# Patient Record
Sex: FEMALE | Race: BLACK OR AFRICAN AMERICAN | NOT HISPANIC OR LATINO
[De-identification: names, ages, dates, MRNs, and addresses within clinical notes are randomized per-mention and may not be internally consistent; named-entity substitution may affect disease eponyms.]

---

## 2017-12-14 PROBLEM — Z00.00 ENCOUNTER FOR PREVENTIVE HEALTH EXAMINATION: Status: ACTIVE | Noted: 2017-12-14

## 2017-12-21 ENCOUNTER — APPOINTMENT (OUTPATIENT)
Dept: ORTHOPEDIC SURGERY | Facility: CLINIC | Age: 82
End: 2017-12-21
Payer: MEDICARE

## 2017-12-21 VITALS — WEIGHT: 123 LBS | HEIGHT: 62 IN | BODY MASS INDEX: 22.63 KG/M2

## 2017-12-21 VITALS — DIASTOLIC BLOOD PRESSURE: 82 MMHG | SYSTOLIC BLOOD PRESSURE: 181 MMHG | HEART RATE: 85 BPM

## 2017-12-21 DIAGNOSIS — Z87.891 PERSONAL HISTORY OF NICOTINE DEPENDENCE: ICD-10-CM

## 2017-12-21 DIAGNOSIS — Z78.9 OTHER SPECIFIED HEALTH STATUS: ICD-10-CM

## 2017-12-21 DIAGNOSIS — Z86.79 PERSONAL HISTORY OF OTHER DISEASES OF THE CIRCULATORY SYSTEM: ICD-10-CM

## 2017-12-21 DIAGNOSIS — S52.125A NONDISPLACED FRACTURE OF HEAD OF LEFT RADIUS, INITIAL ENCOUNTER FOR CLOSED FRACTURE: ICD-10-CM

## 2017-12-21 DIAGNOSIS — Z86.39 PERSONAL HISTORY OF OTHER ENDOCRINE, NUTRITIONAL AND METABOLIC DISEASE: ICD-10-CM

## 2017-12-21 PROCEDURE — 99204 OFFICE O/P NEW MOD 45 MIN: CPT | Mod: 57

## 2017-12-21 PROCEDURE — 24650 CLTX RDL HEAD/NCK FX WO MNPJ: CPT | Mod: LT

## 2017-12-21 PROCEDURE — 73110 X-RAY EXAM OF WRIST: CPT | Mod: LT

## 2017-12-21 PROCEDURE — 73080 X-RAY EXAM OF ELBOW: CPT | Mod: LT

## 2017-12-21 RX ORDER — CHLORTHALIDONE 25 MG/1
25 TABLET ORAL
Refills: 0 | Status: ACTIVE | COMMUNITY

## 2017-12-21 RX ORDER — SIMVASTATIN 10 MG/1
10 TABLET, FILM COATED ORAL
Refills: 0 | Status: ACTIVE | COMMUNITY

## 2017-12-21 RX ORDER — ATENOLOL 50 MG/1
50 TABLET ORAL
Refills: 0 | Status: ACTIVE | COMMUNITY

## 2017-12-21 RX ORDER — POTASSIUM CHLORIDE 2 MEQ/ML
2 INJECTION, SOLUTION, CONCENTRATE INTRAVENOUS
Refills: 0 | Status: ACTIVE | COMMUNITY

## 2017-12-21 RX ORDER — ERGOCALCIFEROL 1.25 MG/1
1.25 MG CAPSULE ORAL
Refills: 0 | Status: ACTIVE | COMMUNITY

## 2017-12-22 PROBLEM — S52.125A CLOSED NONDISPLACED FRACTURE OF HEAD OF LEFT RADIUS, INITIAL ENCOUNTER: Status: ACTIVE | Noted: 2017-12-22

## 2018-01-11 ENCOUNTER — APPOINTMENT (OUTPATIENT)
Dept: ORTHOPEDIC SURGERY | Facility: CLINIC | Age: 83
End: 2018-01-11
Payer: MEDICARE

## 2018-01-11 VITALS
DIASTOLIC BLOOD PRESSURE: 83 MMHG | WEIGHT: 124 LBS | HEIGHT: 62 IN | BODY MASS INDEX: 22.82 KG/M2 | HEART RATE: 86 BPM | SYSTOLIC BLOOD PRESSURE: 183 MMHG

## 2018-01-11 PROCEDURE — 99024 POSTOP FOLLOW-UP VISIT: CPT

## 2018-01-11 PROCEDURE — 73110 X-RAY EXAM OF WRIST: CPT | Mod: LT

## 2018-01-11 PROCEDURE — 73080 X-RAY EXAM OF ELBOW: CPT | Mod: LT

## 2018-01-11 RX ORDER — POTASSIUM CHLORIDE 600 MG/1
8 TABLET, FILM COATED, EXTENDED RELEASE ORAL
Qty: 90 | Refills: 0 | Status: ACTIVE | COMMUNITY
Start: 2017-02-06

## 2018-02-12 ENCOUNTER — APPOINTMENT (OUTPATIENT)
Dept: ORTHOPEDIC SURGERY | Facility: CLINIC | Age: 83
End: 2018-02-12
Payer: MEDICARE

## 2018-02-12 VITALS — DIASTOLIC BLOOD PRESSURE: 77 MMHG | HEART RATE: 80 BPM | SYSTOLIC BLOOD PRESSURE: 180 MMHG

## 2018-02-12 DIAGNOSIS — M25.532 PAIN IN LEFT WRIST: ICD-10-CM

## 2018-02-12 DIAGNOSIS — S52.125D NONDISPLACED FRACTURE OF HEAD OF LEFT RADIUS, SUBSEQUENT ENCOUNTER FOR CLOSED FRACTURE WITH ROUTINE HEALING: ICD-10-CM

## 2018-02-12 PROCEDURE — 73110 X-RAY EXAM OF WRIST: CPT | Mod: LT

## 2018-02-12 PROCEDURE — 99024 POSTOP FOLLOW-UP VISIT: CPT

## 2019-09-17 ENCOUNTER — EMERGENCY (EMERGENCY)
Facility: HOSPITAL | Age: 84
LOS: 1 days | Discharge: HOME | End: 2019-09-17
Attending: EMERGENCY MEDICINE
Payer: COMMERCIAL

## 2019-09-17 VITALS
OXYGEN SATURATION: 95 % | DIASTOLIC BLOOD PRESSURE: 81 MMHG | TEMPERATURE: 99 F | HEART RATE: 85 BPM | RESPIRATION RATE: 16 BRPM | SYSTOLIC BLOOD PRESSURE: 170 MMHG

## 2019-09-17 VITALS
DIASTOLIC BLOOD PRESSURE: 84 MMHG | SYSTOLIC BLOOD PRESSURE: 169 MMHG | TEMPERATURE: 99 F | HEART RATE: 112 BPM | HEIGHT: 62 IN | OXYGEN SATURATION: 95 % | WEIGHT: 169.98 LBS | RESPIRATION RATE: 16 BRPM

## 2019-09-17 PROCEDURE — 73562 X-RAY EXAM OF KNEE 3: CPT | Mod: 26,LT

## 2019-09-17 PROCEDURE — 99283 EMERGENCY DEPT VISIT LOW MDM: CPT

## 2019-09-17 PROCEDURE — 73502 X-RAY EXAM HIP UNI 2-3 VIEWS: CPT | Mod: 26,LT

## 2019-09-17 PROCEDURE — 73502 X-RAY EXAM HIP UNI 2-3 VIEWS: CPT

## 2019-09-17 PROCEDURE — 99284 EMERGENCY DEPT VISIT MOD MDM: CPT | Mod: 25

## 2019-09-17 PROCEDURE — 73562 X-RAY EXAM OF KNEE 3: CPT

## 2019-09-17 RX ORDER — CYCLOBENZAPRINE HYDROCHLORIDE 10 MG/1
5 TABLET, FILM COATED ORAL ONCE
Refills: 0 | Status: COMPLETED | OUTPATIENT
Start: 2019-09-17 | End: 2019-09-17

## 2019-09-17 RX ORDER — ACETAMINOPHEN 500 MG
650 TABLET ORAL ONCE
Refills: 0 | Status: COMPLETED | OUTPATIENT
Start: 2019-09-17 | End: 2019-09-17

## 2019-09-17 RX ORDER — CYCLOBENZAPRINE HYDROCHLORIDE 10 MG/1
1 TABLET, FILM COATED ORAL
Qty: 10 | Refills: 0
Start: 2019-09-17 | End: 2019-09-26

## 2019-09-17 RX ADMIN — CYCLOBENZAPRINE HYDROCHLORIDE 5 MILLIGRAM(S): 10 TABLET, FILM COATED ORAL at 05:06

## 2019-09-17 RX ADMIN — Medication 650 MILLIGRAM(S): at 05:06

## 2019-09-17 NOTE — ED PROVIDER NOTE - CLINICAL SUMMARY MEDICAL DECISION MAKING FREE TEXT BOX
Patient with worsening right knee pain improved while in the ED. Give Flexeril, pt stable for d/c. Patient with worsening right knee pain improved while in the ED. Give Flexeril, pt stable for d/c.    Xrs show no acute fracture/dislocation. in ED patient able to ambulate though with mild limp. Son will take her home.

## 2019-09-17 NOTE — ED PROVIDER NOTE - PHYSICAL EXAMINATION
Right knee:  Tenderness to anterior left knee  Mild effusion palpated  No erythema, no warmth  Normal ROM of hip  2 plus DP/PT pulses,  Normal cap refill

## 2019-09-17 NOTE — ED PROVIDER NOTE - OBJECTIVE STATEMENT
88 y/o F patient with a significant PMHx of HLD, HTN, and no significant PSHx presents to the ED with worsening right knee pain. Patient denies trauma but states for the last x2 months, she has been having intermittent pain to the right knee. Patient says for the last couple of days, her pain has been radiating to the left hip. Patient says her pain was so severe tonight, she had to present to the ED for evaluation/ Patient denies falls and states her symptoms have significantly improved. Patient denies any other complaints. NKDA.

## 2019-09-17 NOTE — ED PROVIDER NOTE - CARE PROVIDER_API CALL
Bg Ashford)  Orthopaedic Surgery  [address] [place] [address], [address] [place] [address]  Phone: [phone]  Fax: [phone]  Follow Up Time:

## 2019-09-17 NOTE — ED PROVIDER NOTE - PATIENT PORTAL LINK FT
You can access the FollowMyHealth Patient Portal offered by [facility] by registering at the following website: [email or web address]. By joining [employer]’s FollowMyHealth portal, you will also be able to view your health information using other applications (apps) compatible with our system.

## 2019-09-19 PROBLEM — I10 ESSENTIAL (PRIMARY) HYPERTENSION: Chronic | Status: ACTIVE | Noted: 2019-09-17

## 2019-09-19 PROBLEM — E78.5 HYPERLIPIDEMIA, UNSPECIFIED: Chronic | Status: ACTIVE | Noted: 2019-09-17

## 2019-09-23 ENCOUNTER — APPOINTMENT (OUTPATIENT)
Dept: ORTHOPEDIC SURGERY | Facility: CLINIC | Age: 84
End: 2019-09-23
Payer: MEDICARE

## 2019-09-23 VITALS
HEART RATE: 80 BPM | DIASTOLIC BLOOD PRESSURE: 80 MMHG | SYSTOLIC BLOOD PRESSURE: 161 MMHG | BODY MASS INDEX: 29.44 KG/M2 | HEIGHT: 62 IN | WEIGHT: 160 LBS

## 2019-09-23 DIAGNOSIS — M17.12 UNILATERAL PRIMARY OSTEOARTHRITIS, LEFT KNEE: ICD-10-CM

## 2019-09-23 PROCEDURE — 73564 X-RAY EXAM KNEE 4 OR MORE: CPT | Mod: LT

## 2019-09-23 PROCEDURE — 99214 OFFICE O/P EST MOD 30 MIN: CPT

## 2019-09-23 RX ORDER — DICLOFENAC SODIUM 20 MG/G
2 SOLUTION TOPICAL
Qty: 1 | Refills: 0 | Status: ACTIVE | COMMUNITY
Start: 2019-09-23 | End: 1900-01-01

## 2019-09-23 NOTE — DISCUSSION/SUMMARY
[de-identified] : Knee patellofemoral chondromalacia\par \par The patient and I discussed the causes and progression of degenerative joint disease of the knee. Models, diagrams and drawings were used in the discussion. Treatment can include conservative non-operative management and surgical options. Conservative management includes weight loss, activity modification, physical therapy to improve motion and strength in the muscles around the knee and the body's core, PO and topical NSAIDs, corticosteroid and/or viscosupplementation intra-articular injections. If the patient fails to improve with non-operative management, surgical management is possible. Depending upon the patient's age, BMI, activity level, degree and location of arthrosis different surgical options are possible including arthroscopic debridement with chondroplasty, high-tibial osteotomy, unicondylar/partial arthroplasty, and total joint arthroplasty.\par \par Physical therapy was prescribed for knee ROM exercises, strengthening exercises, deep tissue massage, core strengthening, hip abductor strengthening, VMO strengthening, modalities PRN, and home exercises\par \par \par The patient was prescribed Diclofenac topical liquid/creme non-steroidal anti-inflammatory medication. 1-2 pumps twice daily and apply to area with pain. There is low systemic absorption of the medication but risks while reduced remain were discussed and include but not limited to renal damage and GI ulceration and bleeding. They were warned to stop the medication if worsening buring skin or gastric pain or dizziness or other side effects. Also to immediately stop the medication and seek appropriate medical attention if any severe stomach ache, gastritis, black/red vomit, black/red stools or any other medical concern.\par \par declined CSI\par \par The patient verifies their understanding the the visit, diagnosis and plan. They agree with the treatment plan and will contact the office with any questions or problems.\par Follow up\par PRN

## 2019-09-23 NOTE — HISTORY OF PRESENT ILLNESS
[de-identified] : CC Left knee\par \par HPI 86 yo female right HD presents with [chronic] onset of 3 weeks of activity related pain in the anterior Left knee [without injury]. The pain is [worse], and rated a 10 out of 10, described as pain, [without radiation]. [Rest] makes the pain better and [walking standing stairs] makes the pain worse. The patient reports associated symptoms of grinding. The patient - pain at night affecting sleep, and + similar pain previously.\par \par The patient has tried the following treatments:\par Activity modification	+\par Ice/Compression  	+\par Braces    		-\par Nsaids    		+\par Physical Therapy 	-\par Cortisone Injection	-\par Visco Injection		-\par Arthroscopy		-\par \par Review of Systems is positive for the above musculoskeletal symptoms and is otherwise non-contributory for general, constitutional, psychiatric, neurologic, HEENT, cardiac, respiratory, gastrointestinal, reproductive, lymphatic, and dermatologic complaints.\par \par Consult by Dr Francie Copeland\par \par

## 2019-09-23 NOTE — PHYSICAL EXAM
[de-identified] : Physical Examination\par General: well nourished, in no acute distress, alert and oriented to person, place and time\par Psychiatric: normal mood and affect, no abnormal movements or speech patterns\par Eyes: vision intact - glasses\par Throat: no thyromegaly\par Lymph: no enlarged nodes, no lymphedema in extremity\par Respiratory: no wheezing, no shortness of breath with ambulation\par Cardiac: no cardiac leg swelling, 2+ peripheral pulses\par Neurology: normal gross sensation in extremities to light touch\par Abdomen: soft, non-tender, tympanic, no masses\par \par Musculoskeletal Examination\par Ambulation	+ antalgic gait, - assistive devices\par \par Knee			Right			Left\par General\par      Swelling/Deformity	normal			normal	\par      Skin			normal			normal\par      Erythema		-			-\par      Standing Alignment	neutral			neutral\par      Effusion		none			trace\par Range of Motion\par      Hip			full painless ROM		full painless ROM\par      Knee Flexion		130			125\par      Knee Extension	0			0\par Patella\par      J Sign		-			-\par      Quad Medial/Lateral	1/1 1/1\par      Apprehension		-			-\par      Mian's		-			+\par      Grind Sign		-			+\par      Crepitus		-			+\par Palpation\par      Medial Joint Line	-			-\par      Medial Fem Condyle	-			-\par      Lateral Joint Line	-			+\par      Quad Tendon		-			-\par      Patella Tendon	-			-\par      Medial Patella		-			-\par      Lateral Patella 	-			-\par      Posterior Knee	-			-\par Ligamentous\par      Varus @ 0° / 30°	-/-			-/-\par      Valgus @ 0° / 30°	-/-			-/-\par      Lachman		-			-\par      Pivot Shift		-			-\par      Anterior Drawer	-			-\par      Posterior Drawer	-			-\par Meniscus\par      Robert		-			-\par      Flexion Pinch		-			-\par Strength Examination/Atrophy\par      Hip Flexors 		5+			5+\par      Quadriceps		5+			5+\par      Hamstring		5+			5+\par      Tibialis Anterior	5+			5+\par      Achilles/Soleus	5+			5+\par Sensation\par      Deep Peroneal	normal			normal\par      Superficial Peroneal 	normal			normal\par      Sural  		normal			normal\par      Posterior Tibial 	normal			normal\par      Saphneous 		normal			normal\par Pulses\par      DP			2+			2+\par  [de-identified] : 5 views of the affected Left knee (standing AP, flexing standing AP, 30degree flexed lateral, 0degree lateral, sunrise view)\par were ordered, obtained and evaluated by myself today and\par demonstrate:\par There is no narrowing\par trace osteophytic lipping\par trace suprapatellar effusion\par Mild osteophytes with mild patellofemoral joint space loss without evidence of tilt [or] subluxation on sunrise view\par Normal soft tissue density\par Otherwise normal osseous bone structure without fracture or dislocation

## 2021-03-14 ENCOUNTER — INPATIENT (INPATIENT)
Facility: HOSPITAL | Age: 86
LOS: 7 days | Discharge: NURSING, REHAB OR LONG-TERM CARE | DRG: 551 | End: 2021-03-22
Attending: STUDENT IN AN ORGANIZED HEALTH CARE EDUCATION/TRAINING PROGRAM | Admitting: STUDENT IN AN ORGANIZED HEALTH CARE EDUCATION/TRAINING PROGRAM
Payer: COMMERCIAL

## 2021-03-14 ENCOUNTER — TRANSCRIPTION ENCOUNTER (OUTPATIENT)
Age: 86
End: 2021-03-14

## 2021-03-14 VITALS
HEIGHT: 62 IN | TEMPERATURE: 99 F | OXYGEN SATURATION: 96 % | HEART RATE: 122 BPM | SYSTOLIC BLOOD PRESSURE: 128 MMHG | DIASTOLIC BLOOD PRESSURE: 83 MMHG | RESPIRATION RATE: 17 BRPM

## 2021-03-14 DIAGNOSIS — U07.1 COVID-19: ICD-10-CM

## 2021-03-14 DIAGNOSIS — R09.89 OTHER SPECIFIED SYMPTOMS AND SIGNS INVOLVING THE CIRCULATORY AND RESPIRATORY SYSTEMS: ICD-10-CM

## 2021-03-14 DIAGNOSIS — M17.10 UNILATERAL PRIMARY OSTEOARTHRITIS, UNSPECIFIED KNEE: ICD-10-CM

## 2021-03-14 DIAGNOSIS — Z29.9 ENCOUNTER FOR PROPHYLACTIC MEASURES, UNSPECIFIED: ICD-10-CM

## 2021-03-14 DIAGNOSIS — N18.9 CHRONIC KIDNEY DISEASE, UNSPECIFIED: ICD-10-CM

## 2021-03-14 DIAGNOSIS — E78.5 HYPERLIPIDEMIA, UNSPECIFIED: ICD-10-CM

## 2021-03-14 DIAGNOSIS — R29.898 OTHER SYMPTOMS AND SIGNS INVOLVING THE MUSCULOSKELETAL SYSTEM: ICD-10-CM

## 2021-03-14 DIAGNOSIS — I10 ESSENTIAL (PRIMARY) HYPERTENSION: ICD-10-CM

## 2021-03-14 DIAGNOSIS — D72.829 ELEVATED WHITE BLOOD CELL COUNT, UNSPECIFIED: ICD-10-CM

## 2021-03-14 DIAGNOSIS — E87.6 HYPOKALEMIA: ICD-10-CM

## 2021-03-14 DIAGNOSIS — N17.9 ACUTE KIDNEY FAILURE, UNSPECIFIED: ICD-10-CM

## 2021-03-14 LAB
ALBUMIN SERPL ELPH-MCNC: 2.8 G/DL — LOW (ref 3.5–5)
ALP SERPL-CCNC: 93 U/L — SIGNIFICANT CHANGE UP (ref 40–120)
ALT FLD-CCNC: 15 U/L DA — SIGNIFICANT CHANGE UP (ref 10–60)
ANION GAP SERPL CALC-SCNC: 14 MMOL/L — SIGNIFICANT CHANGE UP (ref 5–17)
APTT BLD: 27.8 SEC — SIGNIFICANT CHANGE UP (ref 27.5–35.5)
AST SERPL-CCNC: 11 U/L — SIGNIFICANT CHANGE UP (ref 10–40)
BASOPHILS # BLD AUTO: 0.04 K/UL — SIGNIFICANT CHANGE UP (ref 0–0.2)
BASOPHILS NFR BLD AUTO: 0.4 % — SIGNIFICANT CHANGE UP (ref 0–2)
BILIRUB SERPL-MCNC: 0.5 MG/DL — SIGNIFICANT CHANGE UP (ref 0.2–1.2)
BUN SERPL-MCNC: 25 MG/DL — HIGH (ref 7–18)
CALCIUM SERPL-MCNC: 9 MG/DL — SIGNIFICANT CHANGE UP (ref 8.4–10.5)
CHLORIDE SERPL-SCNC: 102 MMOL/L — SIGNIFICANT CHANGE UP (ref 96–108)
CO2 SERPL-SCNC: 23 MMOL/L — SIGNIFICANT CHANGE UP (ref 22–31)
CREAT SERPL-MCNC: 1.46 MG/DL — HIGH (ref 0.5–1.3)
EOSINOPHIL # BLD AUTO: 0.32 K/UL — SIGNIFICANT CHANGE UP (ref 0–0.5)
EOSINOPHIL NFR BLD AUTO: 2.8 % — SIGNIFICANT CHANGE UP (ref 0–6)
GLUCOSE SERPL-MCNC: 134 MG/DL — HIGH (ref 70–99)
HCT VFR BLD CALC: 40.9 % — SIGNIFICANT CHANGE UP (ref 34.5–45)
HGB BLD-MCNC: 13.8 G/DL — SIGNIFICANT CHANGE UP (ref 11.5–15.5)
IMM GRANULOCYTES NFR BLD AUTO: 0.8 % — SIGNIFICANT CHANGE UP (ref 0–1.5)
INR BLD: 1.25 RATIO — HIGH (ref 0.88–1.16)
LYMPHOCYTES # BLD AUTO: 1.63 K/UL — SIGNIFICANT CHANGE UP (ref 1–3.3)
LYMPHOCYTES # BLD AUTO: 14.3 % — SIGNIFICANT CHANGE UP (ref 13–44)
MCHC RBC-ENTMCNC: 29.8 PG — SIGNIFICANT CHANGE UP (ref 27–34)
MCHC RBC-ENTMCNC: 33.7 GM/DL — SIGNIFICANT CHANGE UP (ref 32–36)
MCV RBC AUTO: 88.3 FL — SIGNIFICANT CHANGE UP (ref 80–100)
MONOCYTES # BLD AUTO: 0.81 K/UL — SIGNIFICANT CHANGE UP (ref 0–0.9)
MONOCYTES NFR BLD AUTO: 7.1 % — SIGNIFICANT CHANGE UP (ref 2–14)
NEUTROPHILS # BLD AUTO: 8.49 K/UL — HIGH (ref 1.8–7.4)
NEUTROPHILS NFR BLD AUTO: 74.6 % — SIGNIFICANT CHANGE UP (ref 43–77)
NRBC # BLD: 0 /100 WBCS — SIGNIFICANT CHANGE UP (ref 0–0)
PLATELET # BLD AUTO: 265 K/UL — SIGNIFICANT CHANGE UP (ref 150–400)
POTASSIUM SERPL-MCNC: 3.1 MMOL/L — LOW (ref 3.5–5.3)
POTASSIUM SERPL-SCNC: 3.1 MMOL/L — LOW (ref 3.5–5.3)
PROT SERPL-MCNC: 7.3 G/DL — SIGNIFICANT CHANGE UP (ref 6–8.3)
PROTHROM AB SERPL-ACNC: 14.7 SEC — HIGH (ref 10.6–13.6)
RBC # BLD: 4.63 M/UL — SIGNIFICANT CHANGE UP (ref 3.8–5.2)
RBC # FLD: 13.2 % — SIGNIFICANT CHANGE UP (ref 10.3–14.5)
SARS-COV-2 RNA SPEC QL NAA+PROBE: DETECTED
SODIUM SERPL-SCNC: 139 MMOL/L — SIGNIFICANT CHANGE UP (ref 135–145)
WBC # BLD: 11.38 K/UL — HIGH (ref 3.8–10.5)
WBC # FLD AUTO: 11.38 K/UL — HIGH (ref 3.8–10.5)

## 2021-03-14 PROCEDURE — 71045 X-RAY EXAM CHEST 1 VIEW: CPT | Mod: 26

## 2021-03-14 PROCEDURE — 93971 EXTREMITY STUDY: CPT | Mod: 26,RT

## 2021-03-14 PROCEDURE — 99223 1ST HOSP IP/OBS HIGH 75: CPT | Mod: AI,GC

## 2021-03-14 PROCEDURE — 99285 EMERGENCY DEPT VISIT HI MDM: CPT

## 2021-03-14 RX ORDER — SIMVASTATIN 20 MG/1
1 TABLET, FILM COATED ORAL
Qty: 0 | Refills: 0 | DISCHARGE

## 2021-03-14 RX ORDER — DEXTROSE MONOHYDRATE, SODIUM CHLORIDE, AND POTASSIUM CHLORIDE 50; .745; 4.5 G/1000ML; G/1000ML; G/1000ML
1000 INJECTION, SOLUTION INTRAVENOUS
Refills: 0 | Status: DISCONTINUED | OUTPATIENT
Start: 2021-03-14 | End: 2021-03-15

## 2021-03-14 RX ORDER — POTASSIUM CHLORIDE 20 MEQ
40 PACKET (EA) ORAL ONCE
Refills: 0 | Status: COMPLETED | OUTPATIENT
Start: 2021-03-14 | End: 2021-03-14

## 2021-03-14 RX ORDER — LIDOCAINE 4 G/100G
1 CREAM TOPICAL DAILY
Refills: 0 | Status: DISCONTINUED | OUTPATIENT
Start: 2021-03-14 | End: 2021-03-22

## 2021-03-14 RX ORDER — SIMVASTATIN 20 MG/1
10 TABLET, FILM COATED ORAL AT BEDTIME
Refills: 0 | Status: DISCONTINUED | OUTPATIENT
Start: 2021-03-14 | End: 2021-03-22

## 2021-03-14 RX ORDER — INFLUENZA VIRUS VACCINE 15; 15; 15; 15 UG/.5ML; UG/.5ML; UG/.5ML; UG/.5ML
0.5 SUSPENSION INTRAMUSCULAR ONCE
Refills: 0 | Status: COMPLETED | OUTPATIENT
Start: 2021-03-14 | End: 2021-03-22

## 2021-03-14 RX ORDER — POTASSIUM CHLORIDE 20 MEQ
10 PACKET (EA) ORAL
Refills: 0 | Status: COMPLETED | OUTPATIENT
Start: 2021-03-14 | End: 2021-03-14

## 2021-03-14 RX ORDER — SODIUM CHLORIDE 9 MG/ML
1000 INJECTION INTRAMUSCULAR; INTRAVENOUS; SUBCUTANEOUS ONCE
Refills: 0 | Status: COMPLETED | OUTPATIENT
Start: 2021-03-14 | End: 2021-03-14

## 2021-03-14 RX ORDER — ATENOLOL 25 MG/1
50 TABLET ORAL DAILY
Refills: 0 | Status: DISCONTINUED | OUTPATIENT
Start: 2021-03-14 | End: 2021-03-22

## 2021-03-14 RX ORDER — ATENOLOL 25 MG/1
1 TABLET ORAL
Qty: 0 | Refills: 0 | DISCHARGE

## 2021-03-14 RX ORDER — HEPARIN SODIUM 5000 [USP'U]/ML
5000 INJECTION INTRAVENOUS; SUBCUTANEOUS EVERY 8 HOURS
Refills: 0 | Status: DISCONTINUED | OUTPATIENT
Start: 2021-03-14 | End: 2021-03-15

## 2021-03-14 RX ADMIN — SODIUM CHLORIDE 1000 MILLILITER(S): 9 INJECTION INTRAMUSCULAR; INTRAVENOUS; SUBCUTANEOUS at 18:00

## 2021-03-14 RX ADMIN — Medication 100 MILLIEQUIVALENT(S): at 17:24

## 2021-03-14 RX ADMIN — HEPARIN SODIUM 5000 UNIT(S): 5000 INJECTION INTRAVENOUS; SUBCUTANEOUS at 22:08

## 2021-03-14 RX ADMIN — SIMVASTATIN 10 MILLIGRAM(S): 20 TABLET, FILM COATED ORAL at 22:08

## 2021-03-14 RX ADMIN — Medication 100 MILLIEQUIVALENT(S): at 16:15

## 2021-03-14 RX ADMIN — Medication 40 MILLIEQUIVALENT(S): at 15:32

## 2021-03-14 RX ADMIN — Medication 100 MILLIEQUIVALENT(S): at 18:41

## 2021-03-14 RX ADMIN — DEXTROSE MONOHYDRATE, SODIUM CHLORIDE, AND POTASSIUM CHLORIDE 75 MILLILITER(S): 50; .745; 4.5 INJECTION, SOLUTION INTRAVENOUS at 22:08

## 2021-03-14 NOTE — ED PROVIDER NOTE - OBJECTIVE STATEMENT
Patient c/o numbness and weakness to b/l LE since 3/10, right worse than left. Right leg also swollen. Has been unable to get out bed for 2 days. granddaughter has been caring for her. Unable to bear weight due to leg weakness. No fever, cough, cp, sob, ap, n/v/d.

## 2021-03-14 NOTE — H&P ADULT - ASSESSMENT
Patient is a 89 year old, AAO x3 and ambulating at baseline, w/ PMH of osteoarthritis of left knee, HTN and HLD, presents with left lower leg weakness for the past 5 days. Admitted to r/o stroke

## 2021-03-14 NOTE — ED PROVIDER NOTE - CARE PLAN
Principal Discharge DX:	COVID-19  Secondary Diagnosis:	Hypokalemia  Secondary Diagnosis:	Dehydration

## 2021-03-14 NOTE — ED PROVIDER NOTE - CLINICAL SUMMARY MEDICAL DECISION MAKING FREE TEXT BOX
Patient with b/l leg weakness and right leg swelling. Will check labs, right leg doppler. Will need admission due to inability to ambulate.

## 2021-03-14 NOTE — H&P ADULT - ATTENDING COMMENTS
Patient seen and examined; Agree with PGY2 MAR  A/P above with editing as needed. My independent assessment, findings on exam, diagnosis and plan of care as listed below. Discussed with Dr. Domínguez and Supervising PGY3 Dr. Holden.     89 year old, AAO x3 and ambulating at baseline, w/ PMH of osteoarthritis of left knee, HTN and HLD, presents with left lower leg weakness for the past 5 days. Admitted for worsening weakness over few weeks    ROS/SH: As above; Lives with Grand daughter and Great granddaughter  FH: not contributory to current presentation  denies fever, chills, SOB, palpitations, chest pain, nausea, vomiting, diarrhea, constipation, dizziness      Vital Signs Last 24 Hrs  T(C): 37.2 (14 Mar 2021 16:02), Max: 37.4 (14 Mar 2021 12:02)  T(F): 98.9 (14 Mar 2021 16:02), Max: 99.3 (14 Mar 2021 12:02)  HR: 93 (14 Mar 2021 16:02) (93 - 122)  BP: 119/66 (14 Mar 2021 16:02) (119/66 - 128/83)  RR: 20 (14 Mar 2021 16:02) (17 - 20)  SpO2: 94% (14 Mar 2021 16:02) (94% - 96%)    P/E;  Neuro: AAO x3; Power 2/5 RLE, 3/5LLE; Sensation intact  CVS: S1S2 present, regular  Resp: BLAE+, No wheeze or Rhonchi  GI: Soft, BS+, NT  Extr: No edema or calf tenderness; Knee tenderness B/L Rt >> Lt    Labs:                        13.8   11.38 )-----------( 265      ( 14 Mar 2021 13:23 )             40.9   03-14    139  |  102  |  25<H>  ----------------------------<  134<H>  3.1<L>   |  23  |  1.46<H>    Ca    9.0      14 Mar 2021 13:23    TPro  7.3  /  Alb  2.8<L>  /  TBili  0.5  /  DBili  x   /  AST  11  /  ALT  15  /  AlkPhos  93  03-14    PT/INR - ( 14 Mar 2021 13:23 )   PT: 14.7 sec;   INR: 1.25 ratio    PTT - ( 14 Mar 2021 13:23 )  PTT:27.8 sec     US Duplex Venous Lower Ext Ltd, Right (03.14.21 @ 15:17)    IMPRESSION: No evidence of right lower extremity deep venous thrombosis.    Please note, there is limited visualization of the calf veins.  Therefore, if  clinical concern persists, reevaluation can be performed after 5 to 7 days to evaluate for propagation of clot.    D/D:  Progressive Lower extremity weakness concerning for Lumbosacral radiculopathy  Weakness also due to dehydration with  Acute Kidney injury due to poor oral intake and Diuretic use  Asymptomatic COVID infection/ exposure  Leucocytosis likely reactive    Plan:   Medicine  Airborne and Contact precautions  IV Fluids maintenance 75cc (with 20 meq KCL) x 12 hrs only  Replaced Potassium orally and riders  Hold Chlorthalidone; At this age may not be a candidate to be on diuretic; alternate agent if BP high   Continue Atenolol with parameters  Will get CT LS spine as well as a CT head to rule out any stroke which is less likely   DVT ppx  Patient has no symptoms related to COVID at this time; No Hypoxia, No cough or SOB, No diarrhea etc    Discussed with Housetaff plan of care  PGY1 will reach out to family and update

## 2021-03-14 NOTE — H&P ADULT - PROBLEM SELECTOR PLAN 3
- note to have Cr 1.46; unknown baseline  - eGFR 37  - likely 2/2 intravascular depletion  - c/w IVF   - monitor renal fx - noted to be pcr pos  - asxs  - monitor O2 sat  - f/u covid ab

## 2021-03-14 NOTE — H&P ADULT - PROBLEM SELECTOR PLAN 6
- h/o HLD, on statin  - c/w statin  - f/u lipid panel - noted to have WBC 11.38  - likely 2/2 COVID  - SIRS 2/4 (episode of tachycardia & WBC)  - qSOFA 0  - monitor WBC - note to have Cr 1.46, likely at baseline  - eGFR 37  - monitor renal fx

## 2021-03-14 NOTE — H&P ADULT - HISTORY OF PRESENT ILLNESS
Patient is a 89 year old, AAO x3 and ambulating at baseline, w/ PMH of osteoarthritis of left knee, HTN and HLD, presents with left lower leg weakness for the past 5 days. She states no significant inciting factors, except laying on right-side. She denies any associated pain, but states she is unable to ambulate due to weakness. She endorses nausea, poor oral intake and episode of diarrhea (3x/d) for a week. She denies fever, chills, vomiting, abdominal pain, hematochezia, and melena.

## 2021-03-14 NOTE — H&P ADULT - RS GEN PE MLT RESP DETAILS PC
normal/airway patent/breath sounds equal/good air movement/clear to auscultation bilaterally/no chest wall tenderness/no rales/no rhonchi/no wheezes

## 2021-03-14 NOTE — DISCHARGE NOTE PROVIDER - HOSPITAL COURSE
Patient is a 89 year old, AAO x3 and ambulating at baseline, w/ PMH of osteoarthritis of left knee, HTN and HLD, presents with left lower leg weakness for the past 5 days. Admitted  for worsening weakness over few weeks.      >>>>>>>>>>>>>>>>>>>>>>>>>>>>>>>>>>>>>>>>>I>>>>>>>NCOMPLETE>>>>>>>>>>>>>>>>>>>>>>>>>>>>>>>>>>>>>>>>>>>>>>>>>   Patient is a 89 year old, AAO x3 and ambulating at baseline, w/ PMH of osteoarthritis of left knee, HTN and HLD, presents with left lower leg weakness for the past 5 days. Admitted  for worsening weakness over few weeks.  Patient had elevated D-dimer. CT angio chest showed BL segmental PE. TTE did not show any right heart strain. patient was started on full dose Lovenox later changed to Eliquis.   Patient's Hb dropped initially. GI was consulted. Given her recent PE and COVD status, recommended to monitor. Hb was stable. PPI started empirically.   CT Lumbar Spine shows: L5-S1 suspected impingement of both exiting L5 nerve roots. L4-5 moderate bilateral neural foraminal stenosis. Orthopedic was consulted and recommended conservative management.   Neurology was on board for BL LE weakness. 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 Patient was monitored closely. Muscle strength in LE improved during hospital stay without any intervention. Peripheral neuropathy work up was sent. Patient will follow up as outpatient for further work up.  PT evaluated and deemed patient to appropriate for JOSE.        Patient is a 89 year old, AAO x3 and ambulating at baseline, w/ PMH of osteoarthritis of left knee, HTN and HLD, presents with left lower leg weakness for the past 5 days. Admitted  for worsening weakness over few weeks.  Patient had elevated D-dimer. CT angio chest showed BL segmental PE. TTE did not show any right heart strain. patient was started on full dose Lovenox and transition to Eliquis.   Patient's Hb dropped initially. GI was consulted. Given her recent PE and COVD status, recommended to monitor. Hb was stable. PPI started empirically.   CT Lumbar Spine shows: L5-S1 suspected impingement of both exiting L5 nerve roots. L4-5 moderate bilateral neural foraminal stenosis. Orthopedic was consulted and recommended conservative management.   Neurology was on board for BL LE weakness. 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 Patient was monitored closely. Muscle strength in LE improved during hospital stay without any intervention. Peripheral neuropathy work up was sent. Patient will follow up as outpatient for further work up.  PT evaluated and deemed patient to appropriate for JOSE.     Take Eliquis 10mg PO q12h till 03/27/2021 and than  Take Eliquis 5mg PO q12h.    Take cyanocobalamin 1000mcg IM weekly x 3, folic acid 2mg PO daily, B complex daily.  Follow up with your primary doctor.    Given patient's improved clinical status and current hemodynamic stability, decision was made to discharge.  Please refer to patient's complete medical chart with documents for a full hospital course, for this is only a brief summary.

## 2021-03-14 NOTE — ED PROVIDER NOTE - PROGRESS NOTE DETAILS
Patient is resting comfortably, NAD. I spoke to patient's son Charles and updated him on patient's condition. Patient endorsed to Dr. Burnette.

## 2021-03-14 NOTE — DISCHARGE NOTE PROVIDER - CARE PROVIDER_API CALL
Francie Copeland [facility]  Internal Medicine  [address] [place] [address]  Phone: [phone]  Fax: [phone]  Follow Up Time: 2 weeks   100

## 2021-03-14 NOTE — H&P ADULT - PROBLEM SELECTOR PLAN 5
- h/o hypokalemia, on potassium chloride 8mEq at home  - c/w potassium repletion - noted to have WBC 11.38  - likely 2/2 COVID  - SIRS 2/4 (episode of tachycardia & WBC)  - qSOFA 0  - monitor WBC

## 2021-03-14 NOTE — H&P ADULT - PROBLEM SELECTOR PLAN 1
- p/w RLE weakness x5d  - ddx: neuropathy vs stroke vs e-lyte inbalance  - K+ 3.2  - LE doppler unremarkable  - c/w IVF, e-lyte replacement  - f/u x-ray knee & pelvis, CTH - p/w RLE weakness x5d  - ddx: neuropathy vs stroke vs e-lyte inbalance  - K+ 3.2  - LE doppler unremarkable  - c/w IVF, e-lyte replacement  - f/u x-ray knee & pelvis, CTH, CT lumbar spine

## 2021-03-14 NOTE — H&P ADULT - MOTOR
UE strength 5/5 b/l  hip flexion and extension 3/5 on left & 2/5 on right  knee flexion and extension 4/5 on left & 1/5 of right  foot dorsiflexion and plantarflex 5/5 b/w

## 2021-03-14 NOTE — DISCHARGE NOTE PROVIDER - EXTENDED VTE OTHER REASON FREE TEXT
Patient d/c to rehab, on Eliquis, no need to send prescription to pharmacy pt will get meds at rehab.

## 2021-03-14 NOTE — H&P ADULT - RESPIRATORY AND THORAX
# Fall/LOC  seizure vs cardiac vs hypotension/orthostasis; hx does not sound mechanical  -r/o sz as above  -tropes wnl  -repeat ECG (ecg in ED of poor quality). details…

## 2021-03-14 NOTE — DISCHARGE NOTE PROVIDER - NSDCCPCAREPLAN_GEN_ALL_CORE_FT
PRINCIPAL DISCHARGE DIAGNOSIS  Diagnosis: Acute pulmonary embolism  Assessment and Plan of Treatment: You were diagnosed with pulmonary embolism. Please continue to take Eliquis as instructed. Please return to ER if you notice any bleeding.      SECONDARY DISCHARGE DIAGNOSES  Diagnosis: Peripheral neuropathy  Assessment and Plan of Treatment: You presented with BL LE weakness. We investigated the reason for weakness, but was inconclusive. You need follow up with Neurology as outpatient. You will need physical therapy.    Diagnosis: Lumbar nerve root impingement  Assessment and Plan of Treatment: You have nerve root impingement. Continue current pain management. Please avoid NSAIDs as they can damage your kidney.    Diagnosis: Anemia  Assessment and Plan of Treatment: Your hemoglobin has to be monitored. You are at high risk of bleeding as you are taking blood thinner for embolism. Please return to emergency if you notice any bleeding.    Diagnosis: HTN (hypertension)  Assessment and Plan of Treatment: HTN (hypertension)    Diagnosis: COVID-19  Assessment and Plan of Treatment: You were covid positive, which might have contributed to clots in your lungs. Please continue to take Eliquis as directed. You did not need any oxygen while in hospital.    Diagnosis: CKD (chronic kidney disease)  Assessment and Plan of Treatment: Your renal function was stable while in hospital. Please follow up with nephrology as outpatient.     PRINCIPAL DISCHARGE DIAGNOSIS  Diagnosis: Acute pulmonary embolism  Assessment and Plan of Treatment: You were diagnosed with pulmonary embolism. Please continue to take Eliquis as instructed. Please return to ER if you notice any bleeding.      SECONDARY DISCHARGE DIAGNOSES  Diagnosis: Peripheral neuropathy  Assessment and Plan of Treatment: You presented with BL LE weakness. We investigated the reason for weakness, but was inconclusive. You need follow up with Neurology as outpatient. You will need physical therapy. You are advised to take  cyanocobalamin 1000mcg IM weekly x 3, folic acid 2mg PO daily, B complex daily    Diagnosis: Lumbar nerve root impingement  Assessment and Plan of Treatment: You have nerve root impingement. Continue current pain management. Please avoid NSAIDs as they can damage your kidney.    Diagnosis: Anemia  Assessment and Plan of Treatment: Your hemoglobin has to be monitored. You are at high risk of bleeding as you are taking blood thinner for embolism. Please return to emergency if you notice any bleeding.    Diagnosis: HTN (hypertension)  Assessment and Plan of Treatment: HTN (hypertension)    Diagnosis: COVID-19  Assessment and Plan of Treatment: You were covid positive, which might have contributed to clots in your lungs. Please continue to take Eliquis as directed. You did not need any oxygen while in hospital.    Diagnosis: CKD (chronic kidney disease)  Assessment and Plan of Treatment: Your renal function was stable while in hospital. Please follow up with nephrology as outpatient.     PRINCIPAL DISCHARGE DIAGNOSIS  Diagnosis: Acute pulmonary embolism  Assessment and Plan of Treatment: You were diagnosed with pulmonary embolism. Please continue to take Eliquis as instructed. Please return to ER if you notice any bleeding.  Take Eliquis 10mg PO q12h till 03/27/2021 and than  Take Eliquis 5mg PO q12h.        SECONDARY DISCHARGE DIAGNOSES  Diagnosis: COVID-19  Assessment and Plan of Treatment: You were covid positive, which might have contributed to clots in your lungs. Please continue to take Eliquis as directed. You did not need any oxygen while in hospital.    Diagnosis: Lumbar nerve root impingement  Assessment and Plan of Treatment: You have nerve root impingement. Continue current pain management. Please avoid NSAIDs as they can damage your kidney.    Diagnosis: Peripheral neuropathy  Assessment and Plan of Treatment: You presented with BL LE weakness. We investigated the reason for weakness, but was inconclusive. You need follow up with Neurology as outpatient. You will need physical therapy. You are advised to take  cyanocobalamin 1000mcg IM weekly x 3, folic acid 2mg PO daily, B complex daily    Diagnosis: Anemia  Assessment and Plan of Treatment: Your hemoglobin has to be monitored. You are at high risk of bleeding as you are taking blood thinner for embolism. Please return to emergency if you notice any bleeding.    Diagnosis: HTN (hypertension)  Assessment and Plan of Treatment: HTN (hypertension)    Diagnosis: CKD (chronic kidney disease)  Assessment and Plan of Treatment: Your renal function was stable while in hospital. Please follow up with nephrology as outpatient.

## 2021-03-14 NOTE — H&P ADULT - PROBLEM SELECTOR PLAN 9
IMPROVE VTE Individual Risk Assessment  RISK                                                                Points  [ ] Previous VTE                                                  3  [ ] Thrombophilia                                               2  [x] Lower limb paralysis                                      2        (unable to hold up >15 seconds)    [ ] Current Cancer                                              2         (within 6 months)  [x] Immobilization > 24 hrs                                1  [ ] ICU/CCU stay > 24 hours                              1  [x] Age > 60                                                      1  IMPROVE VTE Score ___4_____    heparin for dvt ppx

## 2021-03-14 NOTE — H&P ADULT - PROBLEM SELECTOR PLAN 8
IMPROVE VTE Individual Risk Assessment  RISK                                                                Points  [ ] Previous VTE                                                  3  [ ] Thrombophilia                                               2  [x] Lower limb paralysis                                      2        (unable to hold up >15 seconds)    [ ] Current Cancer                                              2         (within 6 months)  [x] Immobilization > 24 hrs                                1  [ ] ICU/CCU stay > 24 hours                              1  [x] Age > 60                                                      1  IMPROVE VTE Score ___4_____    heparin for dvt ppx - h/o HTN, on atenolol and chlorthalidone 25mg at home  - c/w atenolol  - hold chlorthalidone given JORDI  - monitor BP

## 2021-03-14 NOTE — DISCHARGE NOTE PROVIDER - NSDCMRMEDTOKEN_GEN_ALL_CORE_FT
atenolol 50 mg oral tablet: 1 tab(s) orally once a day  chlorthalidone 25 mg oral tablet: 1 tab(s) orally once a day  potassium chloride 8 mEq (600 mg) oral tablet, extended release: 1 tab(s) orally 3 times a day  simvastatin 10 mg oral tablet: 1 tab(s) orally once a day (at bedtime)   apixaban 5 mg oral tablet: 2 tab(s) orally every 12 hours until 3/27/2021  then 5mg every 12 hours  atenolol 50 mg oral tablet: 1 tab(s) orally once a day  collagenase 250 units/g topical ointment: 1 application topically once a day, As needed, Decubitus ulcer  cyanocobalamin 1000 mcg oral tablet: 1 tab(s) orally once a day  lidocaine 5% topical film:  topically   melatonin 5 mg oral tablet: 1 tab(s) orally once a day (at bedtime)  simvastatin 10 mg oral tablet: 1 tab(s) orally once a day (at bedtime)  zinc oxide 20% topical ointment: 1 application topically once a day   apixaban 5 mg oral tablet: 2 tab(s) orally every 12 hours until 3/27/2021  then 5mg every 12 hours  atenolol 50 mg oral tablet: 1 tab(s) orally once a day  collagenase 250 units/g topical ointment: 1 application topically once a day, As needed, Decubitus ulcer  cyanocobalamin 1000 mcg oral tablet: 1 tab(s) orally once a day  folic acid 1 mg oral tablet: 1 tab(s) orally once a day  lidocaine 5% topical film:  topically   melatonin 5 mg oral tablet: 1 tab(s) orally once a day (at bedtime)  simvastatin 10 mg oral tablet: 1 tab(s) orally once a day (at bedtime)  zinc oxide 20% topical ointment: 1 application topically once a day

## 2021-03-14 NOTE — H&P ADULT - PROBLEM SELECTOR PLAN 7
- h/o HTN, on atenolol and chlorthalidone 25mg at home  - c/w atenolol  - hold chlorthalidone given JORDI  - monitor BP - h/o HLD, on statin  - c/w statin  - f/u lipid panel

## 2021-03-14 NOTE — ED ADULT NURSE NOTE - OBJECTIVE STATEMENT
states left leg has been stiff since last Wednesday and haven't been able to walk. Denies any fall or injury.

## 2021-03-14 NOTE — H&P ADULT - PROBLEM SELECTOR PLAN 4
- noted to be pcr pos  - asxs  - monitor O2 sat  - f/u covid ab - h/o hypokalemia, on potassium chloride 8mEq at home  - c/w potassium repletion

## 2021-03-15 DIAGNOSIS — R79.89 OTHER SPECIFIED ABNORMAL FINDINGS OF BLOOD CHEMISTRY: ICD-10-CM

## 2021-03-15 LAB
ALBUMIN SERPL ELPH-MCNC: 2.2 G/DL — LOW (ref 3.5–5)
ALP SERPL-CCNC: 78 U/L — SIGNIFICANT CHANGE UP (ref 40–120)
ALT FLD-CCNC: 14 U/L DA — SIGNIFICANT CHANGE UP (ref 10–60)
ANION GAP SERPL CALC-SCNC: 6 MMOL/L — SIGNIFICANT CHANGE UP (ref 5–17)
ANION GAP SERPL CALC-SCNC: 8 MMOL/L — SIGNIFICANT CHANGE UP (ref 5–17)
APTT BLD: 31.4 SEC — SIGNIFICANT CHANGE UP (ref 27.5–35.5)
AST SERPL-CCNC: 34 U/L — SIGNIFICANT CHANGE UP (ref 10–40)
BILIRUB SERPL-MCNC: 0.5 MG/DL — SIGNIFICANT CHANGE UP (ref 0.2–1.2)
BUN SERPL-MCNC: 26 MG/DL — HIGH (ref 7–18)
BUN SERPL-MCNC: 27 MG/DL — HIGH (ref 7–18)
CALCIUM SERPL-MCNC: 8.6 MG/DL — SIGNIFICANT CHANGE UP (ref 8.4–10.5)
CALCIUM SERPL-MCNC: 8.7 MG/DL — SIGNIFICANT CHANGE UP (ref 8.4–10.5)
CHLORIDE SERPL-SCNC: 109 MMOL/L — HIGH (ref 96–108)
CHLORIDE SERPL-SCNC: 112 MMOL/L — HIGH (ref 96–108)
CHOLEST SERPL-MCNC: 79 MG/DL — SIGNIFICANT CHANGE UP
CO2 SERPL-SCNC: 23 MMOL/L — SIGNIFICANT CHANGE UP (ref 22–31)
CO2 SERPL-SCNC: 24 MMOL/L — SIGNIFICANT CHANGE UP (ref 22–31)
CREAT SERPL-MCNC: 1.22 MG/DL — SIGNIFICANT CHANGE UP (ref 0.5–1.3)
CREAT SERPL-MCNC: 1.24 MG/DL — SIGNIFICANT CHANGE UP (ref 0.5–1.3)
D DIMER BLD IA.RAPID-MCNC: HIGH NG/ML DDU
FERRITIN SERPL-MCNC: 902 NG/ML — HIGH (ref 15–150)
FOLATE SERPL-MCNC: 11.6 NG/ML — SIGNIFICANT CHANGE UP
GLUCOSE SERPL-MCNC: 91 MG/DL — SIGNIFICANT CHANGE UP (ref 70–99)
GLUCOSE SERPL-MCNC: 98 MG/DL — SIGNIFICANT CHANGE UP (ref 70–99)
HDLC SERPL-MCNC: 34 MG/DL — LOW
INR BLD: 1.26 RATIO — HIGH (ref 0.88–1.16)
LIPID PNL WITH DIRECT LDL SERPL: 19 MG/DL — SIGNIFICANT CHANGE UP
MAGNESIUM SERPL-MCNC: 1.9 MG/DL — SIGNIFICANT CHANGE UP (ref 1.6–2.6)
NON HDL CHOLESTEROL: 45 MG/DL — SIGNIFICANT CHANGE UP
PHOSPHATE SERPL-MCNC: 1.8 MG/DL — LOW (ref 2.5–4.5)
POTASSIUM SERPL-MCNC: 4.9 MMOL/L — SIGNIFICANT CHANGE UP (ref 3.5–5.3)
POTASSIUM SERPL-MCNC: 5.5 MMOL/L — HIGH (ref 3.5–5.3)
POTASSIUM SERPL-SCNC: 4.9 MMOL/L — SIGNIFICANT CHANGE UP (ref 3.5–5.3)
POTASSIUM SERPL-SCNC: 5.5 MMOL/L — HIGH (ref 3.5–5.3)
PROT SERPL-MCNC: 6.2 G/DL — SIGNIFICANT CHANGE UP (ref 6–8.3)
PROTHROM AB SERPL-ACNC: 14.8 SEC — HIGH (ref 10.6–13.6)
SODIUM SERPL-SCNC: 139 MMOL/L — SIGNIFICANT CHANGE UP (ref 135–145)
SODIUM SERPL-SCNC: 143 MMOL/L — SIGNIFICANT CHANGE UP (ref 135–145)
TRIGL SERPL-MCNC: 128 MG/DL — SIGNIFICANT CHANGE UP
TSH SERPL-MCNC: 4.05 UU/ML — SIGNIFICANT CHANGE UP (ref 0.34–4.82)
VIT B12 SERPL-MCNC: 583 PG/ML — SIGNIFICANT CHANGE UP (ref 232–1245)

## 2021-03-15 PROCEDURE — 93971 EXTREMITY STUDY: CPT | Mod: 26,LT

## 2021-03-15 PROCEDURE — 99233 SBSQ HOSP IP/OBS HIGH 50: CPT | Mod: GC

## 2021-03-15 PROCEDURE — 71045 X-RAY EXAM CHEST 1 VIEW: CPT | Mod: 26

## 2021-03-15 RX ORDER — ENOXAPARIN SODIUM 100 MG/ML
70 INJECTION SUBCUTANEOUS
Refills: 0 | Status: DISCONTINUED | OUTPATIENT
Start: 2021-03-15 | End: 2021-03-18

## 2021-03-15 RX ORDER — COLLAGENASE CLOSTRIDIUM HIST. 250 UNIT/G
1 OINTMENT (GRAM) TOPICAL DAILY
Refills: 0 | Status: DISCONTINUED | OUTPATIENT
Start: 2021-03-15 | End: 2021-03-22

## 2021-03-15 RX ORDER — ZINC OXIDE 200 MG/G
1 OINTMENT TOPICAL DAILY
Refills: 0 | Status: DISCONTINUED | OUTPATIENT
Start: 2021-03-15 | End: 2021-03-22

## 2021-03-15 RX ADMIN — LIDOCAINE 1 PATCH: 4 CREAM TOPICAL at 12:24

## 2021-03-15 RX ADMIN — LIDOCAINE 1 PATCH: 4 CREAM TOPICAL at 19:45

## 2021-03-15 RX ADMIN — ATENOLOL 50 MILLIGRAM(S): 25 TABLET ORAL at 05:03

## 2021-03-15 RX ADMIN — ENOXAPARIN SODIUM 70 MILLIGRAM(S): 100 INJECTION SUBCUTANEOUS at 18:13

## 2021-03-15 RX ADMIN — SIMVASTATIN 10 MILLIGRAM(S): 20 TABLET, FILM COATED ORAL at 21:07

## 2021-03-15 RX ADMIN — HEPARIN SODIUM 5000 UNIT(S): 5000 INJECTION INTRAVENOUS; SUBCUTANEOUS at 05:03

## 2021-03-15 NOTE — ADVANCED PRACTICE NURSE CONSULT - RECOMMEDATIONS
-Clean all wounds with normal saline and apply skin prep to the surrounding skin  -Apply Collagenase ointment to the eschar areas of the R. Hip wound, apply saline moistened gauze to the wound bed, and cover with a Foam dressing Daily PRN  -Apply Zinc Oxide Moisture Barrier Cream to the Gluteal Fold, Perianal, and Bilateral Gluteal areas b.i.d. PRN  -Frequent toileting  -Moisturize the patients skin b.i.d. PRN  -Elevate/float the patients heels using heel protectors and reposition the patient Q 2hrs using wedges or pillows

## 2021-03-15 NOTE — PROGRESS NOTE ADULT - PROBLEM SELECTOR PLAN 4
- noted to be pcr pos  - asxs  - monitor O2 sat  - f/u covid ab Covid Positive  currently 98% RA   C/w Isolation Precautions  - monitor O2 sat  - f/u covid ab

## 2021-03-15 NOTE — PROGRESS NOTE ADULT - PROBLEM SELECTOR PLAN 1
- p/w RLE weakness x5d  - ddx: neuropathy vs stroke vs e-lyte inbalance  - K+ 3.2  - LE doppler unremarkable  - c/w IVF, e-lyte replacement  - f/u x-ray knee & pelvis, CTH, CT lumbar spine p/w RLE weakness x5d 2/2 lumbar Radiculopathy  - RLE doppler negative  - LLE doppler ordered   , CTH ordered  , CT lumbar spine ordered p/w RLE weakness x5d 2/2 lumbar Radiculopathy  - RLE doppler negative  - LLE doppler ordered   , CTH ordered  , CT lumbar spine ordered  PT recommends JOSE

## 2021-03-15 NOTE — PROGRESS NOTE ADULT - PROBLEM SELECTOR PLAN 6
- noted to have WBC 11.38  - likely 2/2 COVID  - SIRS 2/4 (episode of tachycardia & WBC)  - qSOFA 0  - monitor WBC

## 2021-03-15 NOTE — PROGRESS NOTE ADULT - ATTENDING COMMENTS
Patient seen and examined; Agree with NP  A/P above with editing as needed. My independent assessment, findings on exam, diagnosis and plan of care as listed below. Discussed with NP Keyonna.    89 year old, AAO x3 and ambulating at baseline, w/ PMH of osteoarthritis of left knee, HTN and HLD, presents with lower leg weakness especially right for the past 5 days. Admitted for worsening weakness over few weeks      denies fever, chills, SOB, palpitations, chest pain, nausea, vomiting, diarrhea, constipation, dizziness    Vital Signs Last 24 Hrs  T(C): 36.7 (15 Mar 2021 13:11), Max: 36.9 (15 Mar 2021 05:09)  T(F): 98.1 (15 Mar 2021 13:11), Max: 98.4 (15 Mar 2021 05:09)  HR: 79 (15 Mar 2021 13:25) (74 - 79)  BP: 149/61 (15 Mar 2021 13:25) (106/47 - 149/61)  BP(mean): 84 (15 Mar 2021 13:25) (62 - 84)  RR: 15 (15 Mar 2021 13:11) (15 - 16)  SpO2: 90% (15 Mar 2021 13:25) (90% - 100%)    P/E;  Neuro: AAO x3; Power 2-3/5 B/L LE; Sensation intact  CVS: S1S2 present, regular  Resp: BLAE+, No wheeze or Rhonchi  GI: Soft, BS+, NT  Extr: No edema or calf tenderness; Knee tenderness Rt>Left    Labs:                        13.8   11.38 )-----------( 265      ( 14 Mar 2021 13:23 )             40.9     03-15    143  |  112<H>  |  27<H>  ----------------------------<  91  4.9   |  23  |  1.22    Ca    8.6      15 Mar 2021 08:52  Phos  1.8     03-15  Mg     1.9     03-15    TPro  6.2  /  Alb  2.2<L>  /  TBili  0.5  /  DBili  x   /  AST  34  /  ALT  14  /  AlkPhos  78  03-15    Ferritin, Serum in AM (03.15.21 @ 11:43) Ferritin, Serum: 902 ng/mL   Vitamin B12, Serum (03.15.21 @ 11:43) Vitamin B12, Serum: 583 pg/mL   Folate, Serum (03.15.21 @ 11:43) Folate, Serum: 11.6:   D-Dimer Assay, Quantitative (03.15.21 @ 06:22) D-Dimer Assay, Quantitative: 90317 ng/mL DDU   Thyroid Stimulating Hormone, Serum (03.15.21 @ 06:22) Thyroid Stimulating Hormone, Serum: 4.05 uU/mL     D/D:  Progressive Lower extremity weakness concerning for Lumbosacral radiculopathy doubt stroke  Weakness also due to dehydration with  Acute Kidney injury due to poor oral intake and Diuretic use resolved  Hypokalemia resolved due to diuretic and decreased oral intake  Asymptomatic COVID infection/ exposure  Leucocytosis likely reactive    Plan:   Airborne and Contact precautions  Elevated D- dimer will get CT Angio to evaluate PE given underlying COVID although clinically does not appear to have PE; also complete the venous doppler of LLE; RLE no DVT; Will switch Lovenox to therapeutic twice daily dosing until VTE is ruled out.   Hold Chlorthalidone; At this age may not be a candidate to be on diuretic; alternate agent if BP high   Continue Atenolol with parameters  Still awaiting a CT scan of Head and LS spine ordered last night as well as needing a CT Angiogram to assess VTE given elevated D-dimer   DVT ppx on Lovenox  Patient has no symptoms related to COVID at this time; No Hypoxia, No cough or SOB, No diarrhea etc    Discussed with patient findings and plan of care  Discussed with RN and NP Keyonna; specifically spoke with CT Tech   NP also advised to update Son as per patient wishes Patient seen and examined; Agree with NP  A/P above with editing as needed. My independent assessment, findings on exam, diagnosis and plan of care as listed below. Discussed with NP Keyonna.    89 year old, AAO x3 and ambulating at baseline, w/ PMH of osteoarthritis of left knee, HTN and HLD, presents with lower leg weakness especially right for the past 5 days. Admitted for worsening weakness over few weeks    denies fever, chills, SOB, palpitations, chest pain, nausea, vomiting, diarrhea, constipation, dizziness    Vital Signs Last 24 Hrs  T(C): 36.7 (15 Mar 2021 13:11), Max: 36.9 (15 Mar 2021 05:09)  T(F): 98.1 (15 Mar 2021 13:11), Max: 98.4 (15 Mar 2021 05:09)  HR: 79 (15 Mar 2021 13:25) (74 - 79)  BP: 149/61 (15 Mar 2021 13:25) (106/47 - 149/61)  BP(mean): 84 (15 Mar 2021 13:25) (62 - 84)  RR: 15 (15 Mar 2021 13:11) (15 - 16)  SpO2: 90% (15 Mar 2021 13:25) (90% - 100%)    P/E;  Neuro: AAO x3; Power 2-3/5 B/L LE; Sensation intact  CVS: S1S2 present, regular  Resp: BLAE+, No wheeze or Rhonchi  GI: Soft, BS+, NT  Extr: No edema or calf tenderness; Knee tenderness Rt>Left    Labs:                        13.8   11.38 )-----------( 265      ( 14 Mar 2021 13:23 )             40.9     03-15    143  |  112<H>  |  27<H>  ----------------------------<  91  4.9   |  23  |  1.22    Ca    8.6      15 Mar 2021 08:52  Phos  1.8     03-15  Mg     1.9     03-15    TPro  6.2  /  Alb  2.2<L>  /  TBili  0.5  /  DBili  x   /  AST  34  /  ALT  14  /  AlkPhos  78  03-15    Ferritin, Serum in AM (03.15.21 @ 11:43) Ferritin, Serum: 902 ng/mL   Vitamin B12, Serum (03.15.21 @ 11:43) Vitamin B12, Serum: 583 pg/mL   Folate, Serum (03.15.21 @ 11:43) Folate, Serum: 11.6:   D-Dimer Assay, Quantitative (03.15.21 @ 06:22) D-Dimer Assay, Quantitative: 04751 ng/mL DDU   Thyroid Stimulating Hormone, Serum (03.15.21 @ 06:22) Thyroid Stimulating Hormone, Serum: 4.05 uU/mL     D/D:  Progressive Lower extremity weakness concerning for Lumbosacral radiculopathy doubt stroke  Weakness also due to dehydration with  Acute Kidney injury due to poor oral intake and Diuretic use resolved  Hypokalemia resolved due to diuretic and decreased oral intake  Hypophosphatemia   Asymptomatic COVID infection/ exposure  Leucocytosis likely reactive    Plan:   Airborne and Contact precautions  Elevated D- dimer will get CT Angio to evaluate PE given underlying COVID although clinically does not appear to have PE; also complete the venous doppler of LLE; RLE no DVT; Will switch Lovenox to therapeutic twice daily dosing until VTE is ruled out.   Spoke with CT Tech regarding pending CTs; d/w RN Lucero  Hold Chlorthalidone; At this age may not be a candidate to be on diuretic; alternate agent if BP high   Continue Atenolol with parameters  Replace phosphate; Repeat AM  Still awaiting a CT scan of Head and LS spine ordered last night as well as needing a CT Angiogram to assess VTE given elevated D-dimer   DVT ppx on Lovenox  Patient has no symptoms related to COVID at this time; No Hypoxia, No cough or SOB, No diarrhea etc  PT eval    Discussed with patient findings and plan of care  Discussed with RN and NP Keyonna; specifically spoke with CT Tech   NP also advised to update Son as per patient wishes    I will be away from 3/16/21 onwards; Dr. Valencia/ Dr. Farfan to assume care in AM

## 2021-03-15 NOTE — PROGRESS NOTE ADULT - SUBJECTIVE AND OBJECTIVE BOX
NP Note discussed with  Primary Attending    Patient is a 89y old  Female who presents with a chief complaint of r/o stroke (14 Mar 2021 21:08)      INTERVAL HPI/OVERNIGHT EVENTS: no new complaints    MEDICATIONS  (STANDING):  ATENolol  Tablet 50 milliGRAM(s) Oral daily  enoxaparin Injectable 70 milliGRAM(s) SubCutaneous two times a day  influenza   Vaccine 0.5 milliLiter(s) IntraMuscular once  lidocaine   Patch 1 Patch Transdermal daily  simvastatin 10 milliGRAM(s) Oral at bedtime    MEDICATIONS  (PRN):      __________________________________________________  REVIEW OF SYSTEMS:    CONSTITUTIONAL: No fever,   EYES: no acute visual disturbances  NECK: No pain or stiffness  RESPIRATORY: No cough; No shortness of breath  CARDIOVASCULAR: No chest pain, no palpitations  GASTROINTESTINAL: No pain. No nausea or vomiting; No diarrhea   NEUROLOGICAL: No headache or numbness, no tremors  MUSCULOSKELETAL: No joint pain, no muscle pain  GENITOURINARY: no dysuria, no frequency, no hesitancy  PSYCHIATRY: no depression , no anxiety  ALL OTHER  ROS negative        Vital Signs Last 24 Hrs  T(C): 36.7 (15 Mar 2021 13:11), Max: 37.2 (14 Mar 2021 16:02)  T(F): 98.1 (15 Mar 2021 13:11), Max: 98.9 (14 Mar 2021 16:02)  HR: 79 (15 Mar 2021 13:25) (74 - 93)  BP: 149/61 (15 Mar 2021 13:25) (106/47 - 149/61)  BP(mean): 84 (15 Mar 2021 13:25) (62 - 84)  RR: 15 (15 Mar 2021 13:11) (15 - 20)  SpO2: 90% (15 Mar 2021 13:25) (90% - 100%)    ________________________________________________  PHYSICAL EXAM:  GENERAL: NAD  HEENT: Normocephalic;  conjunctivae and sclerae clear; moist mucous membranes;   NECK : supple  CHEST/LUNG: Clear to auscultation bilaterally with good air entry   HEART: S1 S2  regular; no murmurs, gallops or rubs  ABDOMEN: Soft, Nontender, Nondistended; Bowel sounds present  EXTREMITIES: no cyanosis; no edema; no calf tenderness  SKIN: warm and dry; no rash  NERVOUS SYSTEM:  Awake and alert; Oriented  to place, person and time ; no new deficits    _________________________________________________  LABS:                        13.8   11.38 )-----------( 265      ( 14 Mar 2021 13:23 )             40.9     03-15    143  |  112<H>  |  27<H>  ----------------------------<  91  4.9   |  23  |  1.22    Ca    8.6      15 Mar 2021 08:52  Phos  1.8     03-15  Mg     1.9     03-15    TPro  6.2  /  Alb  2.2<L>  /  TBili  0.5  /  DBili  x   /  AST  34  /  ALT  14  /  AlkPhos  78  03-15    PT/INR - ( 15 Mar 2021 06:22 )   PT: 14.8 sec;   INR: 1.26 ratio         PTT - ( 15 Mar 2021 06:22 )  PTT:31.4 sec    CAPILLARY BLOOD GLUCOSE      < from: US Duplex Venous Lower Ext Ltd, Right (03.14.21 @ 15:17) >  IMPRESSION:  No evidence of right lower extremity deep venous thrombosis.    Please note, there is limited visualization of the calf veins.  Therefore, if  clinical concern persists, reevaluation can be performed after 5 to 7 days to evaluate for propagation of clot.        < end of copied text >        RADIOLOGY & ADDITIONAL TESTS:    Imaging Personally Reviewed:  YES/NO    Consultant(s) Notes Reviewed:   YES/ No    Care Discussed with Consultants :     Plan of care was discussed with patient and /or primary care giver; all questions and concerns were addressed and care was aligned with patient's wishes.     NP Note discussed with  Primary Attending    Patient is a 89y old  Female who presents with a chief complaint of r/o stroke (14 Mar 2021 21:08)      INTERVAL HPI/OVERNIGHT EVENTS: no new complaints    MEDICATIONS  (STANDING):  ATENolol  Tablet 50 milliGRAM(s) Oral daily  enoxaparin Injectable 70 milliGRAM(s) SubCutaneous two times a day  influenza   Vaccine 0.5 milliLiter(s) IntraMuscular once  lidocaine   Patch 1 Patch Transdermal daily  simvastatin 10 milliGRAM(s) Oral at bedtime    MEDICATIONS  (PRN):      __________________________________________________  REVIEW OF SYSTEMS:    CONSTITUTIONAL: No fever,   EYES: no acute visual disturbances  NECK: No pain or stiffness  RESPIRATORY: No cough; No shortness of breath  CARDIOVASCULAR: No chest pain, no palpitations  GASTROINTESTINAL: No pain. No nausea or vomiting; No diarrhea   NEUROLOGICAL: No headache or numbness, no tremors  MUSCULOSKELETAL: No joint pain, no muscle pain  GENITOURINARY: no dysuria, no frequency, no hesitancy  PSYCHIATRY: no depression , no anxiety  ALL OTHER  ROS negative        Vital Signs Last 24 Hrs  T(C): 36.7 (15 Mar 2021 13:11), Max: 37.2 (14 Mar 2021 16:02)  T(F): 98.1 (15 Mar 2021 13:11), Max: 98.9 (14 Mar 2021 16:02)  HR: 79 (15 Mar 2021 13:25) (74 - 93)  BP: 149/61 (15 Mar 2021 13:25) (106/47 - 149/61)  BP(mean): 84 (15 Mar 2021 13:25) (62 - 84)  RR: 15 (15 Mar 2021 13:11) (15 - 20)  SpO2: 90% (15 Mar 2021 13:25) (90% - 100%)    ________________________________________________  PHYSICAL EXAM:  GENERAL: NAD  HEENT: Normocephalic;  conjunctivae and sclerae clear; moist mucous membranes;   NECK : supple  CHEST/LUNG: Clear to auscultation bilaterally with good air entry   HEART: S1 S2  regular; no murmurs, gallops or rubs  ABDOMEN: Soft, Nontender, Nondistended; Bowel sounds present  EXTREMITIES: no cyanosis; no edema; no calf tenderness  SKIN: warm and dry; no rash  NERVOUS SYSTEM:  Awake and alert; Oriented  to place, person and time ; no new deficits    _________________________________________________  LABS:                        13.8   11.38 )-----------( 265      ( 14 Mar 2021 13:23 )             40.9     03-15    143  |  112<H>  |  27<H>  ----------------------------<  91  4.9   |  23  |  1.22    Ca    8.6      15 Mar 2021 08:52  Phos  1.8     03-15  Mg     1.9     03-15    TPro  6.2  /  Alb  2.2<L>  /  TBili  0.5  /  DBili  x   /  AST  34  /  ALT  14  /  AlkPhos  78  03-15    PT/INR - ( 15 Mar 2021 06:22 )   PT: 14.8 sec;   INR: 1.26 ratio         PTT - ( 15 Mar 2021 06:22 )  PTT:31.4 sec    CAPILLARY BLOOD GLUCOSE      < from: US Duplex Venous Lower Ext Ltd, Right (03.14.21 @ 15:17) >  IMPRESSION:  No evidence of right lower extremity deep venous thrombosis.    Please note, there is limited visualization of the calf veins.  Therefore, if  clinical concern persists, reevaluation can be performed after 5 to 7 days to evaluate for propagation of clot.        < end of copied text >    cst< from: Xray Chest 1 View- PORTABLE-Urgent (Xray Chest 1 View- PORTABLE-Urgent .) (03.15.21 @ 11:30) >  FINDINGS/  IMPRESSION:  Nonspecific round opacity left lung base without significant change. No pleural effusion    Heart size cannot be accurately assessed in this projection.    < end of copied text >          RADIOLOGY & ADDITIONAL TESTS:    Imaging Personally Reviewed:  YES/NO    Consultant(s) Notes Reviewed:   YES/ No    Care Discussed with Consultants :     Plan of care was discussed with patient and /or primary care giver; all questions and concerns were addressed and care was aligned with patient's wishes.     NP Note discussed with  Primary Attending    Patient is a 89 year old, AAO x3 and ambulating at baseline, w/ PMH of osteoarthritis of left knee, HTN and HLD, presents with left lower leg weakness for the past 5 days. She states no significant inciting factors, except laying on right-side. She denies any associated pain, but states she is unable to ambulate due to weakness. She endorses nausea, poor oral intake and episode of diarrhea (3x/d) for a week. She denies fever, chills, vomiting, abdominal pain, hematochezia, and melena.          INTERVAL HPI/OVERNIGHT EVENTS: no new complaints    MEDICATIONS  (STANDING):  ATENolol  Tablet 50 milliGRAM(s) Oral daily  enoxaparin Injectable 70 milliGRAM(s) SubCutaneous two times a day  influenza   Vaccine 0.5 milliLiter(s) IntraMuscular once  lidocaine   Patch 1 Patch Transdermal daily  simvastatin 10 milliGRAM(s) Oral at bedtime    MEDICATIONS  (PRN):      __________________________________________________  REVIEW OF SYSTEMS:    CONSTITUTIONAL: No fever,   EYES: no acute visual disturbances  NECK: No pain or stiffness  RESPIRATORY: No cough; No shortness of breath  CARDIOVASCULAR: No chest pain, no palpitations  GASTROINTESTINAL: No pain. No nausea or vomiting; No diarrhea   NEUROLOGICAL: No headache or numbness, no tremors  MUSCULOSKELETAL: No joint pain, no muscle pain  GENITOURINARY: no dysuria, no frequency, no hesitancy  PSYCHIATRY: no depression , no anxiety  ALL OTHER  ROS negative        Vital Signs Last 24 Hrs  T(C): 36.7 (15 Mar 2021 13:11), Max: 37.2 (14 Mar 2021 16:02)  T(F): 98.1 (15 Mar 2021 13:11), Max: 98.9 (14 Mar 2021 16:02)  HR: 79 (15 Mar 2021 13:25) (74 - 93)  BP: 149/61 (15 Mar 2021 13:25) (106/47 - 149/61)  BP(mean): 84 (15 Mar 2021 13:25) (62 - 84)  RR: 15 (15 Mar 2021 13:11) (15 - 20)  SpO2: 90% (15 Mar 2021 13:25) (90% - 100%)    ________________________________________________  PHYSICAL EXAM:  GENERAL: NAD  HEENT: Normocephalic;  conjunctivae and sclerae clear; moist mucous membranes;   NECK : supple  CHEST/LUNG: Clear to auscultation bilaterally with good air entry   HEART: S1 S2  regular; no murmurs, gallops or rubs  ABDOMEN: Soft, Nontender, Nondistended; Bowel sounds present  EXTREMITIES: no cyanosis; no edema; no calf tenderness  SKIN: warm and dry; no rash  NERVOUS SYSTEM:  Awake and alert; Oriented  to place, person and time ; no new deficits    _________________________________________________  LABS:                        13.8   11.38 )-----------( 265      ( 14 Mar 2021 13:23 )             40.9     03-15    143  |  112<H>  |  27<H>  ----------------------------<  91  4.9   |  23  |  1.22    Ca    8.6      15 Mar 2021 08:52  Phos  1.8     03-15  Mg     1.9     03-15    TPro  6.2  /  Alb  2.2<L>  /  TBili  0.5  /  DBili  x   /  AST  34  /  ALT  14  /  AlkPhos  78  03-15    PT/INR - ( 15 Mar 2021 06:22 )   PT: 14.8 sec;   INR: 1.26 ratio         PTT - ( 15 Mar 2021 06:22 )  PTT:31.4 sec    CAPILLARY BLOOD GLUCOSE      < from: US Duplex Venous Lower Ext Ltd, Right (03.14.21 @ 15:17) >  IMPRESSION:  No evidence of right lower extremity deep venous thrombosis.    Please note, there is limited visualization of the calf veins.  Therefore, if  clinical concern persists, reevaluation can be performed after 5 to 7 days to evaluate for propagation of clot.        < end of copied text >    cst< from: Xray Chest 1 View- PORTABLE-Urgent (Xray Chest 1 View- PORTABLE-Urgent .) (03.15.21 @ 11:30) >  FINDINGS/  IMPRESSION:  Nonspecific round opacity left lung base without significant change. No pleural effusion    Heart size cannot be accurately assessed in this projection.    < end of copied text >          RADIOLOGY & ADDITIONAL TESTS:    Imaging Personally Reviewed:  YES/NO    Consultant(s) Notes Reviewed:   YES/ No    Care Discussed with Consultants :     Plan of care was discussed with patient and /or primary care giver; all questions and concerns were addressed and care was aligned with patient's wishes.

## 2021-03-15 NOTE — PROGRESS NOTE ADULT - PROBLEM SELECTOR PLAN 3
d-dimer [id number] and switched to FUll dose Lovenox  CTA chest ordered   DVT negative on Right leg  VA Duplex on left leg ordered

## 2021-03-15 NOTE — PROGRESS NOTE ADULT - PROBLEM SELECTOR PLAN 2
- h/o osteoarthritis of left knee, not on any med  - c/w acetaminophen  - f/u x-ray of knee - h/o osteoarthritis of left knee, not on any med  - c/w acetaminophen

## 2021-03-15 NOTE — PROGRESS NOTE ADULT - PROBLEM SELECTOR PLAN 5
- h/o hypokalemia, on potassium chloride 8mEq at home  - c/w potassium repletion - h/o hypokalemia, on potassium chloride 8mEq at home  - c/w potassium repletion  WNL 3/15

## 2021-03-15 NOTE — PROGRESS NOTE ADULT - PROBLEM SELECTOR PLAN 10
IMPROVE VTE Individual Risk Assessment  RISK                                                                Points  [ ] Previous VTE                                                  3  [ ] Thrombophilia                                               2  [x] Lower limb paralysis                                      2        (unable to hold up >15 seconds)    [ ] Current Cancer                                              2         (within 6 months)  [x] Immobilization > 24 hrs                                1  [ ] ICU/CCU stay > 24 hours                              1  [x] Age > 60                                                      1  IMPROVE VTE Score ___4_____    heparin for dvt ppx Lovenox full dose

## 2021-03-15 NOTE — ADVANCED PRACTICE NURSE CONSULT - ASSESSMENT
This is a 89yr old female patient admitted for JOSE-COV-2, presenting with the following:  -There is a Stage 1 Pressure Injury to the Bilateral Heels, as evident by non-blanchable erythema  -There is a R. Hip Unstageable Pressure Injury (4cm x 2.5cm) with echar, red tissue, and no drainage  -There is evidence of Bilateral Lower Extremity dry skin  -There is evidence of Incontinence Associated Dermatitis to the Gluteal Fold, Perianal, Groin, and Bilateral Gluteal areas

## 2021-03-15 NOTE — PHYSICAL THERAPY INITIAL EVALUATION ADULT - MANUAL MUSCLE TESTING RESULTS, REHAB EVAL
Gross MMT R UE 3/5, L UE 3+/5, L bilateral HIp ext 3+/5, knee extension 1/5, bilateral knee flexion 2/5, bilateral PF 4/5, bilateral DF 3/5/grossly assessed due to

## 2021-03-16 LAB
ALBUMIN SERPL ELPH-MCNC: 2.2 G/DL — LOW (ref 3.5–5)
ALP SERPL-CCNC: 77 U/L — SIGNIFICANT CHANGE UP (ref 40–120)
ALT FLD-CCNC: 13 U/L DA — SIGNIFICANT CHANGE UP (ref 10–60)
ANION GAP SERPL CALC-SCNC: 7 MMOL/L — SIGNIFICANT CHANGE UP (ref 5–17)
APPEARANCE UR: CLEAR — SIGNIFICANT CHANGE UP
AST SERPL-CCNC: 16 U/L — SIGNIFICANT CHANGE UP (ref 10–40)
BASOPHILS # BLD AUTO: 0.04 K/UL — SIGNIFICANT CHANGE UP (ref 0–0.2)
BASOPHILS NFR BLD AUTO: 0.4 % — SIGNIFICANT CHANGE UP (ref 0–2)
BILIRUB SERPL-MCNC: 0.3 MG/DL — SIGNIFICANT CHANGE UP (ref 0.2–1.2)
BILIRUB UR-MCNC: NEGATIVE — SIGNIFICANT CHANGE UP
BUN SERPL-MCNC: 21 MG/DL — HIGH (ref 7–18)
CALCIUM SERPL-MCNC: 9.1 MG/DL — SIGNIFICANT CHANGE UP (ref 8.4–10.5)
CHLORIDE SERPL-SCNC: 110 MMOL/L — HIGH (ref 96–108)
CHLORIDE UR-SCNC: 167 MMOL/L — SIGNIFICANT CHANGE UP
CO2 SERPL-SCNC: 25 MMOL/L — SIGNIFICANT CHANGE UP (ref 22–31)
COLOR SPEC: YELLOW — SIGNIFICANT CHANGE UP
COVID-19 SPIKE DOMAIN AB INTERP: POSITIVE
COVID-19 SPIKE DOMAIN ANTIBODY RESULT: >250 U/ML — HIGH
CREAT ?TM UR-MCNC: 127 MG/DL — SIGNIFICANT CHANGE UP
CREAT SERPL-MCNC: 1.2 MG/DL — SIGNIFICANT CHANGE UP (ref 0.5–1.3)
D DIMER BLD IA.RAPID-MCNC: 3459 NG/ML DDU — HIGH
DIFF PNL FLD: ABNORMAL
EOSINOPHIL # BLD AUTO: 0.65 K/UL — HIGH (ref 0–0.5)
EOSINOPHIL NFR BLD AUTO: 6.3 % — HIGH (ref 0–6)
FERRITIN SERPL-MCNC: 795 NG/ML — HIGH (ref 15–150)
GLUCOSE SERPL-MCNC: 90 MG/DL — SIGNIFICANT CHANGE UP (ref 70–99)
GLUCOSE UR QL: NEGATIVE — SIGNIFICANT CHANGE UP
HCT VFR BLD CALC: 33.4 % — LOW (ref 34.5–45)
HGB BLD-MCNC: 11.1 G/DL — LOW (ref 11.5–15.5)
IMM GRANULOCYTES NFR BLD AUTO: 1.5 % — SIGNIFICANT CHANGE UP (ref 0–1.5)
KETONES UR-MCNC: NEGATIVE — SIGNIFICANT CHANGE UP
LEUKOCYTE ESTERASE UR-ACNC: ABNORMAL
LYMPHOCYTES # BLD AUTO: 1.51 K/UL — SIGNIFICANT CHANGE UP (ref 1–3.3)
LYMPHOCYTES # BLD AUTO: 14.6 % — SIGNIFICANT CHANGE UP (ref 13–44)
MCHC RBC-ENTMCNC: 29.4 PG — SIGNIFICANT CHANGE UP (ref 27–34)
MCHC RBC-ENTMCNC: 33.2 GM/DL — SIGNIFICANT CHANGE UP (ref 32–36)
MCV RBC AUTO: 88.6 FL — SIGNIFICANT CHANGE UP (ref 80–100)
MONOCYTES # BLD AUTO: 0.94 K/UL — HIGH (ref 0–0.9)
MONOCYTES NFR BLD AUTO: 9.1 % — SIGNIFICANT CHANGE UP (ref 2–14)
NEUTROPHILS # BLD AUTO: 7.04 K/UL — SIGNIFICANT CHANGE UP (ref 1.8–7.4)
NEUTROPHILS NFR BLD AUTO: 68.1 % — SIGNIFICANT CHANGE UP (ref 43–77)
NITRITE UR-MCNC: NEGATIVE — SIGNIFICANT CHANGE UP
NRBC # BLD: 0 /100 WBCS — SIGNIFICANT CHANGE UP (ref 0–0)
OSMOLALITY UR: 682 MOS/KG — SIGNIFICANT CHANGE UP (ref 50–1200)
PH UR: 5 — SIGNIFICANT CHANGE UP (ref 5–8)
PLATELET # BLD AUTO: 208 K/UL — SIGNIFICANT CHANGE UP (ref 150–400)
POTASSIUM SERPL-MCNC: 4.4 MMOL/L — SIGNIFICANT CHANGE UP (ref 3.5–5.3)
POTASSIUM SERPL-SCNC: 4.4 MMOL/L — SIGNIFICANT CHANGE UP (ref 3.5–5.3)
POTASSIUM UR-SCNC: 82 MMOL/L — SIGNIFICANT CHANGE UP
PROT ?TM UR-MCNC: 41 MG/DL — HIGH (ref 0–12)
PROT SERPL-MCNC: 6.1 G/DL — SIGNIFICANT CHANGE UP (ref 6–8.3)
PROT UR-MCNC: 15
RBC # BLD: 3.77 M/UL — LOW (ref 3.8–5.2)
RBC # FLD: 13.9 % — SIGNIFICANT CHANGE UP (ref 10.3–14.5)
SARS-COV-2 IGG+IGM SERPL QL IA: >250 U/ML — HIGH
SARS-COV-2 IGG+IGM SERPL QL IA: POSITIVE
SODIUM SERPL-SCNC: 142 MMOL/L — SIGNIFICANT CHANGE UP (ref 135–145)
SODIUM UR-SCNC: 75 MMOL/L — SIGNIFICANT CHANGE UP
SP GR SPEC: 1.02 — SIGNIFICANT CHANGE UP (ref 1.01–1.02)
UROBILINOGEN FLD QL: NEGATIVE — SIGNIFICANT CHANGE UP
WBC # BLD: 10.34 K/UL — SIGNIFICANT CHANGE UP (ref 3.8–10.5)
WBC # FLD AUTO: 10.34 K/UL — SIGNIFICANT CHANGE UP (ref 3.8–10.5)

## 2021-03-16 PROCEDURE — 72131 CT LUMBAR SPINE W/O DYE: CPT | Mod: 26

## 2021-03-16 PROCEDURE — 99232 SBSQ HOSP IP/OBS MODERATE 35: CPT | Mod: GC

## 2021-03-16 PROCEDURE — 71275 CT ANGIOGRAPHY CHEST: CPT | Mod: 26

## 2021-03-16 PROCEDURE — 70450 CT HEAD/BRAIN W/O DYE: CPT | Mod: 26

## 2021-03-16 RX ORDER — LANOLIN ALCOHOL/MO/W.PET/CERES
3 CREAM (GRAM) TOPICAL AT BEDTIME
Refills: 0 | Status: DISCONTINUED | OUTPATIENT
Start: 2021-03-16 | End: 2021-03-17

## 2021-03-16 RX ADMIN — LIDOCAINE 1 PATCH: 4 CREAM TOPICAL at 00:31

## 2021-03-16 RX ADMIN — SIMVASTATIN 10 MILLIGRAM(S): 20 TABLET, FILM COATED ORAL at 21:41

## 2021-03-16 RX ADMIN — LIDOCAINE 1 PATCH: 4 CREAM TOPICAL at 12:23

## 2021-03-16 RX ADMIN — ENOXAPARIN SODIUM 70 MILLIGRAM(S): 100 INJECTION SUBCUTANEOUS at 05:33

## 2021-03-16 RX ADMIN — ENOXAPARIN SODIUM 70 MILLIGRAM(S): 100 INJECTION SUBCUTANEOUS at 19:14

## 2021-03-16 RX ADMIN — ATENOLOL 50 MILLIGRAM(S): 25 TABLET ORAL at 05:33

## 2021-03-16 RX ADMIN — ZINC OXIDE 1 APPLICATION(S): 200 OINTMENT TOPICAL at 12:22

## 2021-03-16 RX ADMIN — LIDOCAINE 1 PATCH: 4 CREAM TOPICAL at 19:13

## 2021-03-16 RX ADMIN — Medication 3 MILLIGRAM(S): at 21:41

## 2021-03-16 NOTE — PROGRESS NOTE ADULT - ATTENDING COMMENTS
Patient is a 89y old  Female who presents with a chief complaint of r/o stroke (16 Mar 2021 12:13)    Patient was seen and examined at bedside   Denies any complains     INTERVAL HPI/OVERNIGHT EVENTS:  T(C): 36.6 (03-16-21 @ 13:25), Max: 36.9 (03-15-21 @ 20:59)  HR: 69 (03-16-21 @ 13:25) (69 - 88)  BP: 133/65 (03-16-21 @ 13:25) (133/65 - 144/60)  RR: 16 (03-16-21 @ 13:25) (15 - 16)  SpO2: 100% (03-16-21 @ 13:25) (98% - 100%)  Wt(kg): --  I&O's Summary    REVIEW OF SYSTEMS: denies fever, chills, SOB, palpitations, chest pain, abdominal pain, nausea, vomiting, diarrhea, constipation, dizziness    PHYSICAL EXAM:  GENERAL: NAD, well-groomed, well-developed  NERVOUS SYSTEM:  Alert & Oriented X2-3, Good concentration; Motor Strength 3/5 B/L lower extremities  CHEST/LUNG: Clear to auscultation bilaterally; No rales, rhonchi, wheezing, or rubs  HEART: Regular rate and rhythm; No murmurs, rubs, or gallops  ABDOMEN: Soft, Nontender, Nondistended; Bowel sounds present  EXTREMITIES:  2+ Peripheral Pulses, No clubbing, cyanosis, or edema  SKIN: right hip skin break    LABS:                        11.1   10.34 )-----------( 208      ( 16 Mar 2021 07:26 )             33.4     142  |  110<H>  |  21<H>  ----------------------------<  90  4.4   |  25  |  1.20    A/P:  Asymptomatic COVID 19 infection  Elevated D-dimer  RLE weakness, to r/o radiculopathy vs stroke   HTN  HLD  JORDI resolved     Plan:  Pending CT head and Lumbar spine   Pending CT angio for elevated D-dimer  Cont IVF to avoid contrast induced nephropathy  Cont Atenolol  Monitor respiratory status, does not warrant Remdesevir or Dexamethasone at this moment    Cont full dose Lovenox now, if CTA neg, please dose the Lovenox as per GFR and BMI  Melatonin for insomnia  GOC   Rest of the management as above

## 2021-03-16 NOTE — DIETITIAN INITIAL EVALUATION ADULT. - ADD RECOMMEND
MVI/minerals, Vit C supplements as medically feasible, Monitor needs for Zn supplement MVI/minerals, Vit C supplements as medically feasible, Monitor needs for Zn supplement; Least restricted diet due to medical conditions and advanced age

## 2021-03-16 NOTE — DIETITIAN INITIAL EVALUATION ADULT. - FACTORS AFF FOOD INTAKE
acute on chronic comorbidities including Covid19 infection/Church/ethnic/cultural/personal food preferences

## 2021-03-16 NOTE — PROGRESS NOTE ADULT - SUBJECTIVE AND OBJECTIVE BOX
NP Note discussed with  Primary Attending    Patient is a 89y old  Female who presents with a chief complaint of r/o stroke (16 Mar 2021 11:02)      INTERVAL HPI/OVERNIGHT EVENTS: no new complaints    MEDICATIONS  (STANDING):  ATENolol  Tablet 50 milliGRAM(s) Oral daily  enoxaparin Injectable 70 milliGRAM(s) SubCutaneous two times a day  influenza   Vaccine 0.5 milliLiter(s) IntraMuscular once  lidocaine   Patch 1 Patch Transdermal daily  simvastatin 10 milliGRAM(s) Oral at bedtime  zinc oxide 20% Ointment 1 Application(s) Topical daily    MEDICATIONS  (PRN):  collagenase Ointment 1 Application(s) Topical daily PRN Decubitus ulcer      __________________________________________________  REVIEW OF SYSTEMS:    CONSTITUTIONAL: No fever,   EYES: no acute visual disturbances  NECK: No pain or stiffness  RESPIRATORY: No cough; No shortness of breath  CARDIOVASCULAR: No chest pain, no palpitations  GASTROINTESTINAL: No pain. No nausea or vomiting; No diarrhea   NEUROLOGICAL: No headache or numbness, no tremors  MUSCULOSKELETAL: No joint pain, no muscle pain  GENITOURINARY: no dysuria, no frequency, no hesitancy  PSYCHIATRY: no depression , no anxiety  ALL OTHER  ROS negative        Vital Signs Last 24 Hrs  T(C): 36.6 (16 Mar 2021 05:32), Max: 36.9 (15 Mar 2021 20:59)  T(F): 97.8 (16 Mar 2021 05:32), Max: 98.5 (15 Mar 2021 20:59)  HR: 88 (16 Mar 2021 05:32) (71 - 88)  BP: 144/60 (16 Mar 2021 05:32) (140/61 - 149/61)  BP(mean): 84 (15 Mar 2021 13:25) (81 - 84)  RR: 15 (16 Mar 2021 05:32) (15 - 16)  SpO2: 98% (16 Mar 2021 05:32) (90% - 100%)    ________________________________________________  PHYSICAL EXAM:  GENERAL: NAD  HEENT: Normocephalic;  conjunctivae and sclerae clear; moist mucous membranes;   NECK : supple  CHEST/LUNG: Clear to auscultation bilaterally with good air entry   HEART: S1 S2  regular; no murmurs, gallops or rubs  ABDOMEN: Soft, Nontender, Nondistended; Bowel sounds present  EXTREMITIES: no cyanosis; no edema; no calf tenderness  SKIN: warm and dry; no rash  NERVOUS SYSTEM:  Awake and alert; Oriented  to place, person and time ; no new deficits    _________________________________________________  LABS:                        11.1   10.34 )-----------( 208      ( 16 Mar 2021 07:26 )             33.4     03-16    142  |  110<H>  |  21<H>  ----------------------------<  90  4.4   |  25  |  1.20    Ca    9.1      16 Mar 2021 07:26  Phos  1.8     03-15  Mg     1.9     03-15    TPro  6.1  /  Alb  2.2<L>  /  TBili  0.3  /  DBili  x   /  AST  16  /  ALT  13  /  AlkPhos  77  03-16    PT/INR - ( 15 Mar 2021 06:22 )   PT: 14.8 sec;   INR: 1.26 ratio         PTT - ( 15 Mar 2021 06:22 )  PTT:31.4 sec  Urinalysis Basic - ( 16 Mar 2021 06:00 )    Color: x / Appearance: x / SG: x / pH: x  Gluc: x / Ketone: x  / Bili: x / Urobili: x   Blood: x / Protein: x / Nitrite: x   Leuk Esterase: x / RBC: 2-5 /HPF / WBC 3-5 /HPF   Sq Epi: x / Non Sq Epi: Few /HPF / Bacteria: Few /HPF      CAPILLARY BLOOD GLUCOSE            RADIOLOGY & ADDITIONAL TESTS:    Imaging Personally Reviewed:  YES/NO    Consultant(s) Notes Reviewed:   YES/ No    Care Discussed with Consultants :     Plan of care was discussed with patient and /or primary care giver; all questions and concerns were addressed and care was aligned with patient's wishes.     NP Note discussed with  Primary Attending    Patient is a 89 year old, AAO x3 and ambulating at baseline, w/ PMH of osteoarthritis of left knee, HTN and HLD, presents with left lower leg weakness for the past 5 days. She states no significant inciting factors, except laying on right-side. She denies any associated pain, but states she is unable to ambulate due to weakness. She endorses nausea, poor oral intake and episode of diarrhea (3x/d) for a week. She denies fever, chills, vomiting, abdominal pain, hematochezia, and melena.            INTERVAL HPI/OVERNIGHT EVENTS: no new complaints    MEDICATIONS  (STANDING):  ATENolol  Tablet 50 milliGRAM(s) Oral daily  enoxaparin Injectable 70 milliGRAM(s) SubCutaneous two times a day  influenza   Vaccine 0.5 milliLiter(s) IntraMuscular once  lidocaine   Patch 1 Patch Transdermal daily  simvastatin 10 milliGRAM(s) Oral at bedtime  zinc oxide 20% Ointment 1 Application(s) Topical daily    MEDICATIONS  (PRN):  collagenase Ointment 1 Application(s) Topical daily PRN Decubitus ulcer      __________________________________________________  REVIEW OF SYSTEMS:    CONSTITUTIONAL: No fever,   EYES: no acute visual disturbances  NECK: No pain or stiffness  RESPIRATORY: No cough; No shortness of breath  CARDIOVASCULAR: No chest pain, no palpitations  GASTROINTESTINAL: No pain. No nausea or vomiting; No diarrhea   NEUROLOGICAL: No headache or numbness, no tremors  MUSCULOSKELETAL: No joint pain, no muscle pain  GENITOURINARY: no dysuria, no frequency, no hesitancy  PSYCHIATRY: no depression , no anxiety  ALL OTHER  ROS negative        Vital Signs Last 24 Hrs  T(C): 36.6 (16 Mar 2021 05:32), Max: 36.9 (15 Mar 2021 20:59)  T(F): 97.8 (16 Mar 2021 05:32), Max: 98.5 (15 Mar 2021 20:59)  HR: 88 (16 Mar 2021 05:32) (71 - 88)  BP: 144/60 (16 Mar 2021 05:32) (140/61 - 149/61)  BP(mean): 84 (15 Mar 2021 13:25) (81 - 84)  RR: 15 (16 Mar 2021 05:32) (15 - 16)  SpO2: 98% (16 Mar 2021 05:32) (90% - 100%)    ________________________________________________  PHYSICAL EXAM:  GENERAL: NAD  HEENT: Normocephalic;  conjunctivae and sclerae clear; moist mucous membranes;   NECK : supple  CHEST/LUNG: Clear to auscultation bilaterally with good air entry   HEART: S1 S2  regular; no murmurs, gallops or rubs  ABDOMEN: Soft, Nontender, Nondistended; Bowel sounds present  EXTREMITIES: no cyanosis; no edema; no calf tenderness  SKIN: warm and dry; no rash  NERVOUS SYSTEM:  Awake and alert; Oriented  to place, person and time ; no new deficits    _________________________________________________  LABS:                        11.1   10.34 )-----------( 208      ( 16 Mar 2021 07:26 )             33.4     03-16    142  |  110<H>  |  21<H>  ----------------------------<  90  4.4   |  25  |  1.20    Ca    9.1      16 Mar 2021 07:26  Phos  1.8     03-15  Mg     1.9     03-15    TPro  6.1  /  Alb  2.2<L>  /  TBili  0.3  /  DBili  x   /  AST  16  /  ALT  13  /  AlkPhos  77  03-16    PT/INR - ( 15 Mar 2021 06:22 )   PT: 14.8 sec;   INR: 1.26 ratio         PTT - ( 15 Mar 2021 06:22 )  PTT:31.4 sec  Urinalysis Basic - ( 16 Mar 2021 06:00 )    Color: x / Appearance: x / SG: x / pH: x  Gluc: x / Ketone: x  / Bili: x / Urobili: x   Blood: x / Protein: x / Nitrite: x   Leuk Esterase: x / RBC: 2-5 /HPF / WBC 3-5 /HPF   Sq Epi: x / Non Sq Epi: Few /HPF / Bacteria: Few /HPF      CAPILLARY BLOOD GLUCOSE            RADIOLOGY & ADDITIONAL TESTS:    Imaging Personally Reviewed:  YES/NO    Consultant(s) Notes Reviewed:   YES/ No    Care Discussed with Consultants :     Plan of care was discussed with patient and /or primary care giver; all questions and concerns were addressed and care was aligned with patient's wishes.     NP Note discussed with  Primary Attending    Patient is a 89 year old, AAO x3 and ambulating at baseline, w/ PMH of osteoarthritis of left knee, HTN and HLD, presents with left lower leg weakness for the past 5 days. She states no significant inciting factors, except laying on right-side. She denies any associated pain, but states she is unable to ambulate due to weakness. She endorses nausea, poor oral intake and episode of diarrhea (3x/d) for a week. She denies fever, chills, vomiting, abdominal pain, hematochezia, and melena.            INTERVAL HPI/OVERNIGHT EVENTS: no new complaints    MEDICATIONS  (STANDING):  ATENolol  Tablet 50 milliGRAM(s) Oral daily  enoxaparin Injectable 70 milliGRAM(s) SubCutaneous two times a day  influenza   Vaccine 0.5 milliLiter(s) IntraMuscular once  lidocaine   Patch 1 Patch Transdermal daily  simvastatin 10 milliGRAM(s) Oral at bedtime  zinc oxide 20% Ointment 1 Application(s) Topical daily    MEDICATIONS  (PRN):  collagenase Ointment 1 Application(s) Topical daily PRN Decubitus ulcer      __________________________________________________  REVIEW OF SYSTEMS:    CONSTITUTIONAL: No fever,   EYES: no acute visual disturbances  NECK: No pain or stiffness  RESPIRATORY: No cough; No shortness of breath  CARDIOVASCULAR: No chest pain, no palpitations  GASTROINTESTINAL: No pain. No nausea or vomiting; No diarrhea   NEUROLOGICAL: No headache or numbness, no tremors  MUSCULOSKELETAL: No joint pain, no muscle pain  GENITOURINARY: no dysuria, no frequency, no hesitancy  PSYCHIATRY: no depression , no anxiety  ALL OTHER  ROS negative        Vital Signs Last 24 Hrs  T(C): 36.6 (16 Mar 2021 05:32), Max: 36.9 (15 Mar 2021 20:59)  T(F): 97.8 (16 Mar 2021 05:32), Max: 98.5 (15 Mar 2021 20:59)  HR: 88 (16 Mar 2021 05:32) (71 - 88)  BP: 144/60 (16 Mar 2021 05:32) (140/61 - 149/61)  BP(mean): 84 (15 Mar 2021 13:25) (81 - 84)  RR: 15 (16 Mar 2021 05:32) (15 - 16)  SpO2: 98% (16 Mar 2021 05:32) (90% - 100%)    ________________________________________________  PHYSICAL EXAM:  GENERAL: NAD  HEENT: Normocephalic;  conjunctivae and sclerae clear; moist mucous membranes;   NECK : supple  CHEST/LUNG: Clear to auscultation bilaterally with good air entry   HEART: S1 S2  regular; no murmurs, gallops or rubs  ABDOMEN: Soft, Nontender, Nondistended; Bowel sounds present  EXTREMITIES: no cyanosis; no edema; no calf tenderness  SKIN: warm and dry; no rash  NERVOUS SYSTEM:  Awake and alert; Oriented  to place, person and time ; no new deficits    _________________________________________________  LABS:                        11.1   10.34 )-----------( 208      ( 16 Mar 2021 07:26 )             33.4     03-16    142  |  110<H>  |  21<H>  ----------------------------<  90  4.4   |  25  |  1.20    Ca    9.1      16 Mar 2021 07:26  Phos  1.8     03-15  Mg     1.9     03-15    TPro  6.1  /  Alb  2.2<L>  /  TBili  0.3  /  DBili  x   /  AST  16  /  ALT  13  /  AlkPhos  77  03-16    PT/INR - ( 15 Mar 2021 06:22 )   PT: 14.8 sec;   INR: 1.26 ratio         PTT - ( 15 Mar 2021 06:22 )  PTT:31.4 sec  Urinalysis Basic - ( 16 Mar 2021 06:00 )    Color: x / Appearance: x / SG: x / pH: x  Gluc: x / Ketone: x  / Bili: x / Urobili: x   Blood: x / Protein: x / Nitrite: x   Leuk Esterase: x / RBC: 2-5 /HPF / WBC 3-5 /HPF   Sq Epi: x / Non Sq Epi: Few /HPF / Bacteria: Few /HPF      CAPILLARY BLOOD GLUCOSE  < from: CT Head No Cont (03.16.21 @ 15:54) >  IMPRESSION:    Unremarkable head CT, no acute hemorrhage or midline shift. If symptoms persist consider follow up head CT or MRI if no contraindications.      < end of copied text >    < from: US Duplex Venous Lower Ext Ltd, Left (03.15.21 @ 17:00) >  IMPRESSION:  No evidence of left lower extremity deep venous thrombosis.    < end of copied text >  < from: US Duplex Venous Lower Ext Ltd, Right (03.14.21 @ 15:17) >  MPRESSION:  No evidence of right lower extremity deep venous thrombosis.    Please note, there is limited visualization of the calf veins.  Therefore, if  clinical concern persists, reevaluation can be performed after 5 to 7 days to evaluate for propagation of clot.      < end of copied text >            RADIOLOGY & ADDITIONAL TESTS:    Imaging Personally Reviewed:  YES/NO    Consultant(s) Notes Reviewed:   YES/ No    Care Discussed with Consultants :     Plan of care was discussed with patient and /or primary care giver; all questions and concerns were addressed and care was aligned with patient's wishes.     NP Note discussed with  Primary Attending    Patient is a 89 year old, AAO x3 and ambulating at baseline, w/ PMH of osteoarthritis of left knee, HTN and HLD, presents with left lower leg weakness for the past 5 days. She states no significant inciting factors, except laying on right-side. She denies any associated pain, but states she is unable to ambulate due to weakness. She endorses nausea, poor oral intake and episode of diarrhea (3x/d) for a week. She denies fever, chills, vomiting, abdominal pain, hematochezia, and melena.            INTERVAL HPI/OVERNIGHT EVENTS: no new complaints    MEDICATIONS  (STANDING):  ATENolol  Tablet 50 milliGRAM(s) Oral daily  enoxaparin Injectable 70 milliGRAM(s) SubCutaneous two times a day  influenza   Vaccine 0.5 milliLiter(s) IntraMuscular once  lidocaine   Patch 1 Patch Transdermal daily  simvastatin 10 milliGRAM(s) Oral at bedtime  zinc oxide 20% Ointment 1 Application(s) Topical daily    MEDICATIONS  (PRN):  collagenase Ointment 1 Application(s) Topical daily PRN Decubitus ulcer      __________________________________________________  REVIEW OF SYSTEMS:    CONSTITUTIONAL: No fever,   EYES: no acute visual disturbances  NECK: No pain or stiffness  RESPIRATORY: No cough; No shortness of breath  CARDIOVASCULAR: No chest pain, no palpitations  GASTROINTESTINAL: No pain. No nausea or vomiting; No diarrhea   NEUROLOGICAL: No headache or numbness, no tremors  MUSCULOSKELETAL: No joint pain, no muscle pain  GENITOURINARY: no dysuria, no frequency, no hesitancy  PSYCHIATRY: no depression , no anxiety  ALL OTHER  ROS negative        Vital Signs Last 24 Hrs  T(C): 36.6 (16 Mar 2021 05:32), Max: 36.9 (15 Mar 2021 20:59)  T(F): 97.8 (16 Mar 2021 05:32), Max: 98.5 (15 Mar 2021 20:59)  HR: 88 (16 Mar 2021 05:32) (71 - 88)  BP: 144/60 (16 Mar 2021 05:32) (140/61 - 149/61)  BP(mean): 84 (15 Mar 2021 13:25) (81 - 84)  RR: 15 (16 Mar 2021 05:32) (15 - 16)  SpO2: 98% (16 Mar 2021 05:32) (90% - 100%)    ________________________________________________  PHYSICAL EXAM:  GENERAL: NAD  HEENT: Normocephalic;  conjunctivae and sclerae clear; moist mucous membranes;   NECK : supple  CHEST/LUNG: Clear to auscultation bilaterally with good air entry   HEART: S1 S2  regular; no murmurs, gallops or rubs  ABDOMEN: Soft, Nontender, Nondistended; Bowel sounds present  EXTREMITIES: no cyanosis; no edema; no calf tenderness  SKIN: warm and dry; no rash  NERVOUS SYSTEM:  Awake and alert; Oriented  to place, person and time ; no new deficits    _________________________________________________  LABS:                        11.1   10.34 )-----------( 208      ( 16 Mar 2021 07:26 )             33.4     03-16    142  |  110<H>  |  21<H>  ----------------------------<  90  4.4   |  25  |  1.20    Ca    9.1      16 Mar 2021 07:26  Phos  1.8     03-15  Mg     1.9     03-15    TPro  6.1  /  Alb  2.2<L>  /  TBili  0.3  /  DBili  x   /  AST  16  /  ALT  13  /  AlkPhos  77  03-16    PT/INR - ( 15 Mar 2021 06:22 )   PT: 14.8 sec;   INR: 1.26 ratio         PTT - ( 15 Mar 2021 06:22 )  PTT:31.4 sec  Urinalysis Basic - ( 16 Mar 2021 06:00 )    Color: x / Appearance: x / SG: x / pH: x  Gluc: x / Ketone: x  / Bili: x / Urobili: x   Blood: x / Protein: x / Nitrite: x   Leuk Esterase: x / RBC: 2-5 /HPF / WBC 3-5 /HPF   Sq Epi: x / Non Sq Epi: Few /HPF / Bacteria: Few /HPF      CAPILLARY BLOOD GLUCOSE  < from: CT Head No Cont (03.16.21 @ 15:54) >  IMPRESSION:    Unremarkable head CT, no acute hemorrhage or midline shift. If symptoms persist consider follow up head CT or MRI if no contraindications.  t< from: CT Lumbar Spine No Cont (03.16.21 @ 15:56) >  IMPRESSION:    L5-S1 suspected impingement of both exiting L5 nerve roots.    L4-5 moderate bilateral neural foraminal stenosis.    MRI may provide helpful additional information, if clinically indicated.      < end of copied text >      < end of copied text >    < from: US Duplex Venous Lower Ext Ltd, Left (03.15.21 @ 17:00) >  IMPRESSION:  No evidence of left lower extremity deep venous thrombosis.    < end of copied text >  < from: US Duplex Venous Lower Ext Ltd, Right (03.14.21 @ 15:17) >  MPRESSION:  No evidence of right lower extremity deep venous thrombosis.    Please note, there is limited visualization of the calf veins.  Therefore, if  clinical concern persists, reevaluation can be performed after 5 to 7 days to evaluate for propagation of clot.      < end of copied text >            RADIOLOGY & ADDITIONAL TESTS:    Imaging Personally Reviewed:  YES/NO    Consultant(s) Notes Reviewed:   YES/ No    Care Discussed with Consultants :     Plan of care was discussed with patient and /or primary care giver; all questions and concerns were addressed and care was aligned with patient's wishes.

## 2021-03-16 NOTE — DIETITIAN INITIAL EVALUATION ADULT. - PROBLEM SELECTOR PLAN 2
- h/o osteoarthritis of left knee, not on any med  - c/w acetaminophen  - f/u x-ray of knee plan per MD

## 2021-03-16 NOTE — DIETITIAN INITIAL EVALUATION ADULT. - PROBLEM SELECTOR PLAN 1
- p/w RLE weakness x5d  - ddx: neuropathy vs stroke vs e-lyte inbalance  - K+ 3.2  - LE doppler unremarkable  - c/w IVF, e-lyte replacement  - f/u x-ray knee & pelvis, CTH, CT lumbar spine plan per MD

## 2021-03-16 NOTE — PROGRESS NOTE ADULT - PROBLEM SELECTOR PLAN 3
[id number] and switched to FUll dose Lovenox  CTA chest ordered   DVT negative on Right leg  VA Duplex on left leg ordered [id number] and switched to Full dose Lovenox  CTA chest ordered   DVT negative on Right leg  VA Duplex on left leg ordered [id number] and switched to Full dose Lovenox  CTA chest ordered   DVT negative on Right leg  VA Duplex on left leg ordered  on full dose Lovenox  if CTA neg, please dose the Lovenox as per GFR and BM [id number] and switched to Full dose Lovenox  CTA chest ordered poor study   DVT negative on Right leg  VA Duplex on left leg negative  on full dose Lovenox  if CTA neg, please dose the Lovenox as per GFR and BM [id number] and switched to Full dose Lovenox  CTA chest ordered poor study remove IV and Monitor extremity   DVT negative on Right leg  VA Duplex on left leg negative  on full dose Lovenox  if CTA neg, please dose the Lovenox as per GFR and BM

## 2021-03-16 NOTE — DIETITIAN INITIAL EVALUATION ADULT. - DIET TYPE
Ensure Enlive  1can ( 240ml ) x bid ( 700 kcal, 40 g protein) Ensure Enlive  1can ( 240ml ) x bid ( 700 kcal, 40 g protein)/soft

## 2021-03-16 NOTE — DIETITIAN INITIAL EVALUATION ADULT. - PERTINENT LABORATORY DATA
03-16 Na142 mmol/L Glu 90 mg/dL K+ 4.4 mmol/L Cr  1.20 mg/dL BUN 21 mg/dL<H>   03-15 Phos 1.8 mg/dL<L>   03-16 Alb 2.2 g/dL<L>       03-15 Chol 79 mg/dL LDL --    HDL 34 mg/dL<L> Trig 128 mg/dL

## 2021-03-16 NOTE — PROGRESS NOTE ADULT - PROBLEM SELECTOR PLAN 1
p/w RLE weakness x5d 2/2 lumbar Radiculopathy  - RLE doppler negative  - LLE doppler ordered   , CTH ordered  , CT lumbar spine ordered  PT recommends JOSE p/w RLE weakness x5d 2/2 lumbar Radiculopathy  - RLE doppler negative  - LLE doppler negative   , CTH negative   , CT lumbar spine ordered  PT recommends JOSE p/w RLE weakness x5d 2/2 lumbar Radiculopathy  - RLE doppler negative  - LLE doppler negative   , CTH negative   , CT lumbar spine as above   PT recommends JOSE

## 2021-03-16 NOTE — PROGRESS NOTE ADULT - PROBLEM SELECTOR PLAN 10
Lovenox full dose  Insomnia - Melatonin started Lovenox full dose awaiting CTA results to redose lovenox   Insomnia - Melatonin started

## 2021-03-16 NOTE — PROGRESS NOTE ADULT - PROBLEM SELECTOR PLAN 6
- noted to have WBC 11.38  - likely 2/2 COVID  - SIRS 2/4 (episode of tachycardia & WBC)  - qSOFA 0  - monitor WBC - likely 2/2 COVID  - SIRS 2/4 (episode of tachycardia & WBC)  - monitor WBC

## 2021-03-16 NOTE — DIETITIAN INITIAL EVALUATION ADULT. - FEEDING ASSISTANCE
Provide food choices within diet Rx as available/updated; Nursing to continue feeding assistance and encouragement as needed

## 2021-03-16 NOTE — DIETITIAN INITIAL EVALUATION ADULT. - OTHER INFO
Pt lives home PTA, alert, oriented, COVID19+celine, in airborne/contact isolation room, unable to conduct a face to face interview due to limited contact restrictions related to pt's medical condition and isolation precautions., attempt made to contact pt via phone ( bedside # [id number]), not answering; decreased intake with nausea/diarrhea x 1wk PTA, improving in hospital, spoke to RN, eating better--100% intake today, 75% at times per flow sheet, no GI distress reported at present

## 2021-03-16 NOTE — DIETITIAN INITIAL EVALUATION ADULT. - PERTINENT MEDS FT
MEDICATIONS  (STANDING):  ATENolol  Tablet 50 milliGRAM(s) Oral daily  enoxaparin Injectable 70 milliGRAM(s) SubCutaneous two times a day  influenza   Vaccine 0.5 milliLiter(s) IntraMuscular once  lidocaine   Patch 1 Patch Transdermal daily  simvastatin 10 milliGRAM(s) Oral at bedtime  zinc oxide 20% Ointment 1 Application(s) Topical daily

## 2021-03-17 LAB
ALBUMIN SERPL ELPH-MCNC: 2.4 G/DL — LOW (ref 3.5–5)
ALP SERPL-CCNC: 78 U/L — SIGNIFICANT CHANGE UP (ref 40–120)
ALT FLD-CCNC: 13 U/L DA — SIGNIFICANT CHANGE UP (ref 10–60)
ANION GAP SERPL CALC-SCNC: 6 MMOL/L — SIGNIFICANT CHANGE UP (ref 5–17)
AST SERPL-CCNC: 14 U/L — SIGNIFICANT CHANGE UP (ref 10–40)
BILIRUB SERPL-MCNC: 0.3 MG/DL — SIGNIFICANT CHANGE UP (ref 0.2–1.2)
BUN SERPL-MCNC: 20 MG/DL — HIGH (ref 7–18)
CALCIUM SERPL-MCNC: 9.2 MG/DL — SIGNIFICANT CHANGE UP (ref 8.4–10.5)
CHLORIDE SERPL-SCNC: 106 MMOL/L — SIGNIFICANT CHANGE UP (ref 96–108)
CO2 SERPL-SCNC: 26 MMOL/L — SIGNIFICANT CHANGE UP (ref 22–31)
CREAT SERPL-MCNC: 1.12 MG/DL — SIGNIFICANT CHANGE UP (ref 0.5–1.3)
D DIMER BLD IA.RAPID-MCNC: 2927 NG/ML DDU — HIGH
GLUCOSE BLDC GLUCOMTR-MCNC: 108 MG/DL — HIGH (ref 70–99)
GLUCOSE SERPL-MCNC: 101 MG/DL — HIGH (ref 70–99)
HCT VFR BLD CALC: 33 % — LOW (ref 34.5–45)
HGB BLD-MCNC: 10.8 G/DL — LOW (ref 11.5–15.5)
MCHC RBC-ENTMCNC: 29.3 PG — SIGNIFICANT CHANGE UP (ref 27–34)
MCHC RBC-ENTMCNC: 32.7 GM/DL — SIGNIFICANT CHANGE UP (ref 32–36)
MCV RBC AUTO: 89.7 FL — SIGNIFICANT CHANGE UP (ref 80–100)
NRBC # BLD: 0 /100 WBCS — SIGNIFICANT CHANGE UP (ref 0–0)
PLATELET # BLD AUTO: 253 K/UL — SIGNIFICANT CHANGE UP (ref 150–400)
POTASSIUM SERPL-MCNC: 4.8 MMOL/L — SIGNIFICANT CHANGE UP (ref 3.5–5.3)
POTASSIUM SERPL-SCNC: 4.8 MMOL/L — SIGNIFICANT CHANGE UP (ref 3.5–5.3)
PROT SERPL-MCNC: 5.9 G/DL — LOW (ref 6–8.3)
RBC # BLD: 3.68 M/UL — LOW (ref 3.8–5.2)
RBC # FLD: 14.4 % — SIGNIFICANT CHANGE UP (ref 10.3–14.5)
SODIUM SERPL-SCNC: 138 MMOL/L — SIGNIFICANT CHANGE UP (ref 135–145)
WBC # BLD: 9.64 K/UL — SIGNIFICANT CHANGE UP (ref 3.8–10.5)
WBC # FLD AUTO: 9.64 K/UL — SIGNIFICANT CHANGE UP (ref 3.8–10.5)

## 2021-03-17 PROCEDURE — 71275 CT ANGIOGRAPHY CHEST: CPT | Mod: 26

## 2021-03-17 PROCEDURE — 99223 1ST HOSP IP/OBS HIGH 75: CPT

## 2021-03-17 PROCEDURE — 99232 SBSQ HOSP IP/OBS MODERATE 35: CPT | Mod: GC

## 2021-03-17 RX ORDER — LANOLIN ALCOHOL/MO/W.PET/CERES
5 CREAM (GRAM) TOPICAL AT BEDTIME
Refills: 0 | Status: DISCONTINUED | OUTPATIENT
Start: 2021-03-17 | End: 2021-03-22

## 2021-03-17 RX ORDER — ACETAMINOPHEN 500 MG
650 TABLET ORAL EVERY 6 HOURS
Refills: 0 | Status: DISCONTINUED | OUTPATIENT
Start: 2021-03-17 | End: 2021-03-22

## 2021-03-17 RX ADMIN — Medication 5 MILLIGRAM(S): at 21:03

## 2021-03-17 RX ADMIN — Medication 650 MILLIGRAM(S): at 04:25

## 2021-03-17 RX ADMIN — LIDOCAINE 1 PATCH: 4 CREAM TOPICAL at 12:33

## 2021-03-17 RX ADMIN — LIDOCAINE 1 PATCH: 4 CREAM TOPICAL at 19:15

## 2021-03-17 RX ADMIN — ENOXAPARIN SODIUM 70 MILLIGRAM(S): 100 INJECTION SUBCUTANEOUS at 17:36

## 2021-03-17 RX ADMIN — SIMVASTATIN 10 MILLIGRAM(S): 20 TABLET, FILM COATED ORAL at 21:03

## 2021-03-17 RX ADMIN — Medication 650 MILLIGRAM(S): at 01:59

## 2021-03-17 RX ADMIN — LIDOCAINE 1 PATCH: 4 CREAM TOPICAL at 01:47

## 2021-03-17 RX ADMIN — ENOXAPARIN SODIUM 70 MILLIGRAM(S): 100 INJECTION SUBCUTANEOUS at 05:08

## 2021-03-17 RX ADMIN — ZINC OXIDE 1 APPLICATION(S): 200 OINTMENT TOPICAL at 12:33

## 2021-03-17 RX ADMIN — ATENOLOL 50 MILLIGRAM(S): 25 TABLET ORAL at 05:07

## 2021-03-17 RX ADMIN — Medication 650 MILLIGRAM(S): at 23:53

## 2021-03-17 NOTE — PROGRESS NOTE ADULT - ATTENDING COMMENTS
Pending CTA chest for evaluation of lung mass  Appreciate neuro consult   MRI if approved by neurology

## 2021-03-17 NOTE — CONSULT NOTE ADULT - SUBJECTIVE AND OBJECTIVE BOX
Pt Name: OLMAN FUNK  MRN: [id number]    ORTHOPEDIC CONSULT    Orthopedic diagnosis: Bilateral lower extremity    89yFemaleHPI:  Patient is a 89 year old, AAO x3 and ambulating at baseline, w/ PMH of osteoarthritis of left knee, HTN and HLD, presents with left lower leg weakness for the past 5 days. She states no significant inciting factors, except laying on right-side. She denies any associated pain, but states she is unable to ambulate due to weakness. She endorses nausea, poor oral intake and episode of diarrhea (3x/d) for a week. She denies fever, chills, vomiting, abdominal pain, hematochezia, and melena.   (14 Mar 2021 18:00)    Patient was seen at bedside. States that 15 days ago, she "fell onto her bed" and was laying there and then could not get up. She denies any pain or trauma and states since then she has difficulty getting up since then. Pt denies Chest pain, SOB, dyspnea, paresthesias, N/V/D, abdominal pain, syncope, or pain anywhere else.       AMBULATION: Baseline Ambulation  [   x  ] independent   [     ] With Cane   [     ] With Walker   [     ]  Bedbound   [     ] Pivot transfers to Wheelchair only    4M's age-friendly:  - Medication: age-appropriate  - Mentation:  - Mobility:   - Matters-Most/Goals of Care:    PAST MEDICAL & SURGICAL HISTORY:  HLD (hyperlipidemia)    HTN (hypertension)    No significant past surgical history        ALLERGIES: No Known Allergies      MEDICATIONS: acetaminophen   Tablet .. 650 milliGRAM(s) Oral every 6 hours PRN  ATENolol  Tablet 50 milliGRAM(s) Oral daily  collagenase Ointment 1 Application(s) Topical daily PRN  enoxaparin Injectable 70 milliGRAM(s) SubCutaneous two times a day  influenza   Vaccine 0.5 milliLiter(s) IntraMuscular once  lidocaine   Patch 1 Patch Transdermal daily  melatonin 5 milliGRAM(s) Oral at bedtime  simvastatin 10 milliGRAM(s) Oral at bedtime  zinc oxide 20% Ointment 1 Application(s) Topical daily      PHYSICAL EXAM:    Vital Signs Last 24 Hrs  T(C): 36.6 (17 Mar 2021 04:51), Max: 36.6 (17 Mar 2021 04:51)  T(F): 97.9 (17 Mar 2021 04:51), Max: 97.9 (17 Mar 2021 04:51)  HR: 70 (17 Mar 2021 04:51) (70 - 84)  BP: 111/57 (17 Mar 2021 04:51) (111/57 - 149/76)  BP(mean): --  RR: 16 (17 Mar 2021 04:51) (15 - 16)  SpO2: 98% (17 Mar 2021 04:51) (96% - 98%)    Gen: well developed, well nourished, comfortable  MSK:  Skin pink, warm. No open lesions.  no ct  calves soft  DP/PT 2+, brisk b/l  nvi silt  4/5 strength ehl/ta/gastroc b/l.  Inability to actively flex or extend the knees/hips. The patient is able to dorsiflex and plantarflex the ankles bilaterally with mild weakness. The patient has full sensation, distal pulses 2+. Skin intact.   Lower back examination shows no skin changes, no tenderness to palpation or muscle spasm. no radiating pain. (-) log roll test. No pain to palpation along the lumbar spine processes.     LABS:                        10.8   9.64  )-----------( 253      ( 17 Mar 2021 07:10 )             33.0     03-17    138  |  106  |  20<H>  ----------------------------<  101<H>  4.8   |  26  |  1.12    Ca    9.2      17 Mar 2021 07:10    TPro  5.9<L>  /  Alb  2.4<L>  /  TBili  0.3  /  DBili  x   /  AST  14  /  ALT  13  /  AlkPhos  78  03-17        RADIOLOGY:     < from: CT Lumbar Spine No Cont (03.16.21 @ 15:56) >    EXAM:  CT LUMBAR SPINE                            PROCEDURE DATE:  03/16/2021          INTERPRETATION:  INDICATIONS:  Low back pain. Leg weakness.    TECHNIQUE:  Serial axial images were obtained using multislice helical technique. Sagittal and coronal reformatted images were performed.    COMPARISON EXAMINATION: None available at this time.    FINDINGS:    Normal vertebral body height.    ALIGNMENT: Alignment is anatomic. The lumbosacral angle is mildly indistinct.    L1-L2:  Mild loss of posterior disc height. Minimal degenerative retrolisthesis. Mild broad-based annular bulge. Mild canal and bilateral neural foraminal stenosis.    L2-L3:  Mild loss of posterior disc height. Mild broad-based annular bulge with mild canal and bilateral neural foraminal stenosis.    L3-L4:  Moderate disc space narrowing. Minimal degenerative retrolisthesis. Mild broad-based disc bulge. Mild-to-moderate canal stenosis. Mild-to-moderate bilateral neural foraminal stenosis.    L4-L5:  Moderate loss of mostly posterior disc height. Mild broad-based disc bulge. Mild-to-moderate canal stenosis suspected. Mild facet and ligamentous hypertrophy. Moderate bilateral neural foraminal stenosis.    L5-S1:  Vacuum disc phenomenon. Moderate to severe disc space narrowing worse in the posterior disc space. Moderate to severe bilateral facet hypertrophy with bilateral vacuum joint phenomenon. Suspected moderate AP and transverse canal stenosis. Moderate to severe bilateral neural foraminal stenosis likely impinging both exiting L5 nerve roots.    MISCELLANEOUS:  None.    IMPRESSION:    L5-S1 suspected impingement of both exiting L5 nerve roots.    L4-5 moderate bilateral neural foraminal stenosis.    MRI may provide helpful additional information, if clinically indicated.            BERTHA RAMÍREZ MD, Attending Radiologist  This document has been electronically signed. Mar 16 2021  4:20PM    < end of copied text >      A/P:   89y Female with: Lumbar spinal stenosis with bilateral lower extremity weakness    #  -  Recommendation: [ x ] Conservative treatment   -  All the patient's questions were answered.   -  f/u neuro  -  Recommend MRI L spine  -  Pain control  -  DVT prophylaxis  -  Daily PT  -  no surgical intervention recommended at this time.   -  Case d/w Dr. Breen

## 2021-03-17 NOTE — PROGRESS NOTE ADULT - PROBLEM SELECTOR PLAN 3
[id number] and switched to Full dose Lovenox  CTA chest ordered poor study remove IV and Monitor extremity   DVT negative on Right leg  VA Duplex on left leg negative  on full dose Lovenox  if CTA neg, please dose the Lovenox as per GFR and BM [id number] and switched to Full dose Lovenox  CTA chest ordered poor study remove IV and Monitor extremity   Doppler neg  on full dose Lovenox  if CTA neg, please dose the Lovenox as per GFR and BMI

## 2021-03-17 NOTE — PROGRESS NOTE ADULT - SUBJECTIVE AND OBJECTIVE BOX
NP Note discussed with Primary Attending.    Patient is a 89y old  Female who presents with a chief complaint of r/o stroke (16 Mar 2021 12:13)      INTERVAL HPI/OVERNIGHT EVENTS: no new complaints    MEDICATIONS  (STANDING):  ATENolol  Tablet 50 milliGRAM(s) Oral daily  enoxaparin Injectable 70 milliGRAM(s) SubCutaneous two times a day  influenza   Vaccine 0.5 milliLiter(s) IntraMuscular once  lidocaine   Patch 1 Patch Transdermal daily  melatonin 5 milliGRAM(s) Oral at bedtime  simvastatin 10 milliGRAM(s) Oral at bedtime  zinc oxide 20% Ointment 1 Application(s) Topical daily    MEDICATIONS  (PRN):  acetaminophen   Tablet .. 650 milliGRAM(s) Oral every 6 hours PRN Mild Pain (1 - 3), Moderate Pain (4 - 6), Severe Pain (7 - 10)  collagenase Ointment 1 Application(s) Topical daily PRN Decubitus ulcer      __________________________________________________  REVIEW OF SYSTEMS:    CONSTITUTIONAL: No fever,   EYES: no acute visual disturbances  NECK: No pain or stiffness  RESPIRATORY: No cough; No shortness of breath  CARDIOVASCULAR: No chest pain, no palpitations  GASTROINTESTINAL: No pain. No nausea or vomiting; No diarrhea   NEUROLOGICAL: No headache or numbness, no tremors  MUSCULOSKELETAL: No joint pain, no muscle pain  GENITOURINARY: no dysuria, no frequency, no hesitancy  PSYCHIATRY: no depression , no anxiety  ALL OTHER  ROS negative        Vital Signs Last 24 Hrs  T(C): 36.6 (17 Mar 2021 04:51), Max: 36.6 (17 Mar 2021 04:51)  T(F): 97.9 (17 Mar 2021 04:51), Max: 97.9 (17 Mar 2021 04:51)  HR: 70 (17 Mar 2021 04:51) (70 - 84)  BP: 111/57 (17 Mar 2021 04:51) (111/57 - 149/76)  BP(mean): --  RR: 16 (17 Mar 2021 04:51) (15 - 16)  SpO2: 98% (17 Mar 2021 04:51) (96% - 98%)    ________________________________________________  PHYSICAL EXAM:  GENERAL: NAD  HEENT: Normocephalic;  conjunctivae and sclerae clear; moist mucous membranes;   NECK : supple  CHEST/LUNG: Clear to auscultation bilaterally with good air entry   HEART: S1 S2  regular; no murmurs, gallops or rubs  ABDOMEN: Soft, Nontender, Nondistended; Bowel sounds present  EXTREMITIES: no cyanosis; no edema; no calf tenderness  SKIN: warm and dry; no rash  NERVOUS SYSTEM:  Awake and alert; Oriented  to place, person and time ; no new deficits    _________________________________________________  LABS:                        10.8   9.64  )-----------( 253      ( 17 Mar 2021 07:10 )             33.0     03-17    138  |  106  |  20<H>  ----------------------------<  101<H>  4.8   |  26  |  1.12    Ca    9.2      17 Mar 2021 07:10    TPro  5.9<L>  /  Alb  2.4<L>  /  TBili  0.3  /  DBili  x   /  AST  14  /  ALT  13  /  AlkPhos  78  03-17      Urinalysis Basic - ( 16 Mar 2021 06:00 )    Color: x / Appearance: x / SG: x / pH: x  Gluc: x / Ketone: x  / Bili: x / Urobili: x   Blood: x / Protein: x / Nitrite: x   Leuk Esterase: x / RBC: 2-5 /HPF / WBC 3-5 /HPF   Sq Epi: x / Non Sq Epi: Few /HPF / Bacteria: Few /HPF      CAPILLARY BLOOD GLUCOSE      POCT Blood Glucose.: 108 mg/dL (17 Mar 2021 08:03)        RADIOLOGY & ADDITIONAL TESTS:    EXAM:  CT LUMBAR SPINE                            PROCEDURE DATE:  03/16/2021          INTERPRETATION:  INDICATIONS:  Low back pain. Leg weakness.    TECHNIQUE:  Serial axial images were obtained using multislice helical technique. Sagittal and coronal reformatted images were performed.    COMPARISON EXAMINATION: None available at this time.    FINDINGS:    Normal vertebral body height.    ALIGNMENT: Alignment is anatomic. The lumbosacral angle is mildly indistinct.    L1-L2:  Mild loss of posterior disc height. Minimal degenerative retrolisthesis. Mild broad-based annular bulge. Mild canal and bilateral neural foraminal stenosis.    L2-L3:  Mild loss of posterior disc height. Mild broad-based annular bulge with mild canal and bilateral neural foraminal stenosis.    L3-L4:  Moderate disc space narrowing. Minimal degenerative retrolisthesis. Mild broad-based disc bulge. Mild-to-moderate canal stenosis. Mild-to-moderate bilateral neural foraminal stenosis.    L4-L5:  Moderate loss of mostly posterior disc height. Mild broad-based disc bulge. Mild-to-moderate canal stenosis suspected. Mild facet and ligamentous hypertrophy. Moderate bilateral neural foraminal stenosis.    L5-S1:  Vacuum disc phenomenon. Moderate to severe disc space narrowing worse in the posterior disc space. Moderate to severe bilateral facet hypertrophy with bilateral vacuum joint phenomenon. Suspected moderate AP and transverse canal stenosis. Moderate to severe bilateral neural foraminal stenosis likely impinging both exiting L5 nerve roots.    MISCELLANEOUS:  None.    IMPRESSION:    L5-S1 suspected impingement of both exiting L5 nerve roots.    L4-5 moderate bilateral neural foraminal stenosis.    MRI may provide helpful additional information, if clinically indicated.            BERTHA RAMÍREZ MD, Attending Radiologist  This document has been electronically signed. Mar 16 2021  4:20PM      EXAM:  CT BRAIN                            PROCEDURE DATE:  03/16/2021          INTERPRETATION:  CLINICAL INDICATION: Rule out stroke. Covid-19.      TECHNIQUE: Serial axial images were obtained from the skull base to the vertex without the use of intravenous contrast.  Coronal and sagittal reformations were made.    COMPARISON EXAMINATION:?None available at this time.    FINDINGS:    VENTRICLES AND SULCI: Mildly prominent ventricles and sulci unremarkable for patients age.  INTRA-AXIAL: Non-specific white matter decreased attenuation, likely microvascular disease. No intracranial mass, acute hemorrhage, or midline shift is present.  Basal cisterns are patent.  EXTRA-AXIAL: No extra-axial fluid collection is present.  ORBITS: Unremarkable  VISUALIZED SINUSES: No air-fluid levels are identified.  VISUALIZED MASTOIDS: Clear  CALVARIUM: Intact  MISCELLANEOUS:?Bilateral cataract surgery.    IMPRESSION:    Unremarkable head CT, no acute hemorrhage or midline shift. If symptoms persist consider follow up head CT or MRI if no contraindications.        BERTHA RAMÍREZ MD, Attending Radiologist  This document has been electronically signed. Mar 16 2021  3:57PM    EXAM:  US DPLX LWR EXT VEINS LTD LT                            PROCEDURE DATE:  03/15/2021          INTERPRETATION:  CLINICAL INFORMATION: 89-year-old female with COVID-19 infection and elevated D-dimer and left lower extremity pain. Evaluate for left lower extremity deep venous thrombosis.    COMPARISON: None available.    TECHNIQUE: Duplex sonography of the LEFT LOWER extremity veins with color and spectral Doppler, with and without compression.    FINDINGS:    There is normal compressibilityof the left common femoral, femoral and popliteal veins.  Doppler examination shows normal spontaneous and phasic flow.    No calf vein thrombosis is detected.    IMPRESSION:  No evidence of left lower extremity deep venous thrombosis.    EXAM:  US DPLX LWR EXT VEINS LTD RT                            PROCEDURE DATE:  03/14/2021          INTERPRETATION:  CLINICAL INFORMATION: Right leg swelling    COMPARISON: None available.    TECHNIQUE: Duplex sonography of the RIGHT LOWER extremity veins with color and spectral Doppler, with and without compression.    FINDINGS:    There is normal compressibility of the right common femoral, femoral and popliteal veins.    Doppler examination shows normal spontaneous and phasic flow.    No calf vein thrombosis is detected.    IMPRESSION:  No evidence of right lower extremity deep venous thrombosis.    Please note, there is limited visualization of the calf veins.  Therefore, if  clinical concern persists, reevaluation can be performed after 5 to 7 days to evaluate for propagation of clot.            JAMES ECHEVERRIA MD; Attending Radiologist  This document has been electronically signed. Mar 14 2021  3:40PM    EXAM:  XR CHEST PORTABLE URGENT 1V                            PROCEDURE DATE:  03/15/2021          INTERPRETATION:  CLINICAL STATEMENT: Follow-up chest pain.    TECHNIQUE: AP view of the chest.    COMPARISON: 3/14/2021    FINDINGS/  IMPRESSION:  Nonspecific round opacity left lung base without significant change. No pleural effusion    Heart size cannot be accurately assessed in this projection.              HEYDI GARDINER MD; Attending Radiologist  This document has been electronically signed. Mar15 2021  1:22PM      Imaging  Reviewed:  YES/NO    Consultant(s) Notes Reviewed:   YES/ No      Plan of care was discussed with patient and /or primary care giver; all questions and concerns were addressed  NP Note discussed with Primary Attending.    Patient is a 89y old  Female who presents with a chief complaint of r/o stroke (16 Mar 2021 12:13)      INTERVAL HPI/OVERNIGHT EVENTS: no new complaints    MEDICATIONS  (STANDING):  ATENolol  Tablet 50 milliGRAM(s) Oral daily  enoxaparin Injectable 70 milliGRAM(s) SubCutaneous two times a day  influenza   Vaccine 0.5 milliLiter(s) IntraMuscular once  lidocaine   Patch 1 Patch Transdermal daily  melatonin 5 milliGRAM(s) Oral at bedtime  simvastatin 10 milliGRAM(s) Oral at bedtime  zinc oxide 20% Ointment 1 Application(s) Topical daily    MEDICATIONS  (PRN):  acetaminophen   Tablet .. 650 milliGRAM(s) Oral every 6 hours PRN Mild Pain (1 - 3), Moderate Pain (4 - 6), Severe Pain (7 - 10)  collagenase Ointment 1 Application(s) Topical daily PRN Decubitus ulcer      __________________________________________________  REVIEW OF SYSTEMS:    CONSTITUTIONAL: No fever,   EYES: no acute visual disturbances  NECK: No pain or stiffness  RESPIRATORY: No cough; No shortness of breath  CARDIOVASCULAR: No chest pain, no palpitations  GASTROINTESTINAL: No pain. No nausea or vomiting; No diarrhea   NEUROLOGICAL: No headache or numbness, no tremors  MUSCULOSKELETAL: No joint pain, no muscle pain  GENITOURINARY: no dysuria, no frequency, no hesitancy  PSYCHIATRY: no depression , no anxiety  ALL OTHER  ROS negative        Vital Signs Last 24 Hrs  T(C): 36.6 (17 Mar 2021 04:51), Max: 36.6 (17 Mar 2021 04:51)  T(F): 97.9 (17 Mar 2021 04:51), Max: 97.9 (17 Mar 2021 04:51)  HR: 70 (17 Mar 2021 04:51) (70 - 84)  BP: 111/57 (17 Mar 2021 04:51) (111/57 - 149/76)  BP(mean): --  RR: 16 (17 Mar 2021 04:51) (15 - 16)  SpO2: 98% (17 Mar 2021 04:51) (96% - 98%)    ________________________________________________  PHYSICAL EXAM:  GENERAL: NAD  HEENT: Normocephalic;  conjunctivae and sclerae clear; moist mucous membranes;   NECK : supple  CHEST/LUNG: Clear to auscultation bilaterally with good air entry   HEART: S1 S2  regular; no murmurs, gallops or rubs  ABDOMEN: Soft, Nontender, Nondistended; Bowel sounds present  EXTREMITIES: no cyanosis; no edema; no calf tenderness  SKIN: warm and dry; no rash  NERVOUS SYSTEM:  Awake and alert; Oriented  to place, person and time ; no new deficits, muscle strength 3/5 in BL LE    _________________________________________________  LABS:                        10.8   9.64  )-----------( 253      ( 17 Mar 2021 07:10 )             33.0     03-17    138  |  106  |  20<H>  ----------------------------<  101<H>  4.8   |  26  |  1.12    Ca    9.2      17 Mar 2021 07:10    TPro  5.9<L>  /  Alb  2.4<L>  /  TBili  0.3  /  DBili  x   /  AST  14  /  ALT  13  /  AlkPhos  78  03-17      Urinalysis Basic - ( 16 Mar 2021 06:00 )    Color: x / Appearance: x / SG: x / pH: x  Gluc: x / Ketone: x  / Bili: x / Urobili: x   Blood: x / Protein: x / Nitrite: x   Leuk Esterase: x / RBC: 2-5 /HPF / WBC 3-5 /HPF   Sq Epi: x / Non Sq Epi: Few /HPF / Bacteria: Few /HPF      CAPILLARY BLOOD GLUCOSE      POCT Blood Glucose.: 108 mg/dL (17 Mar 2021 08:03)        RADIOLOGY & ADDITIONAL TESTS:    EXAM:  CT LUMBAR SPINE                            PROCEDURE DATE:  03/16/2021          INTERPRETATION:  INDICATIONS:  Low back pain. Leg weakness.    TECHNIQUE:  Serial axial images were obtained using multislice helical technique. Sagittal and coronal reformatted images were performed.    COMPARISON EXAMINATION: None available at this time.    FINDINGS:    Normal vertebral body height.    ALIGNMENT: Alignment is anatomic. The lumbosacral angle is mildly indistinct.    L1-L2:  Mild loss of posterior disc height. Minimal degenerative retrolisthesis. Mild broad-based annular bulge. Mild canal and bilateral neural foraminal stenosis.    L2-L3:  Mild loss of posterior disc height. Mild broad-based annular bulge with mild canal and bilateral neural foraminal stenosis.    L3-L4:  Moderate disc space narrowing. Minimal degenerative retrolisthesis. Mild broad-based disc bulge. Mild-to-moderate canal stenosis. Mild-to-moderate bilateral neural foraminal stenosis.    L4-L5:  Moderate loss of mostly posterior disc height. Mild broad-based disc bulge. Mild-to-moderate canal stenosis suspected. Mild facet and ligamentous hypertrophy. Moderate bilateral neural foraminal stenosis.    L5-S1:  Vacuum disc phenomenon. Moderate to severe disc space narrowing worse in the posterior disc space. Moderate to severe bilateral facet hypertrophy with bilateral vacuum joint phenomenon. Suspected moderate AP and transverse canal stenosis. Moderate to severe bilateral neural foraminal stenosis likely impinging both exiting L5 nerve roots.    MISCELLANEOUS:  None.    IMPRESSION:    L5-S1 suspected impingement of both exiting L5 nerve roots.    L4-5 moderate bilateral neural foraminal stenosis.    MRI may provide helpful additional information, if clinically indicated.            BERTHA RAMÍREZ MD, Attending Radiologist  This document has been electronically signed. Mar 16 2021  4:20PM      EXAM:  CT BRAIN                            PROCEDURE DATE:  03/16/2021          INTERPRETATION:  CLINICAL INDICATION: Rule out stroke. Covid-19.      TECHNIQUE: Serial axial images were obtained from the skull base to the vertex without the use of intravenous contrast.  Coronal and sagittal reformations were made.    COMPARISON EXAMINATION:?None available at this time.    FINDINGS:    VENTRICLES AND SULCI: Mildly prominent ventricles and sulci unremarkable for patients age.  INTRA-AXIAL: Non-specific white matter decreased attenuation, likely microvascular disease. No intracranial mass, acute hemorrhage, or midline shift is present.  Basal cisterns are patent.  EXTRA-AXIAL: No extra-axial fluid collection is present.  ORBITS: Unremarkable  VISUALIZED SINUSES: No air-fluid levels are identified.  VISUALIZED MASTOIDS: Clear  CALVARIUM: Intact  MISCELLANEOUS:?Bilateral cataract surgery.    IMPRESSION:    Unremarkable head CT, no acute hemorrhage or midline shift. If symptoms persist consider follow up head CT or MRI if no contraindications.        BERTHA RAMÍREZ MD, Attending Radiologist  This document has been electronically signed. Mar 16 2021  3:57PM    EXAM:  US DPLX LWR EXT VEINS LTD LT                            PROCEDURE DATE:  03/15/2021          INTERPRETATION:  CLINICAL INFORMATION: 89-year-old female with COVID-19 infection and elevated D-dimer and left lower extremity pain. Evaluate for left lower extremity deep venous thrombosis.    COMPARISON: None available.    TECHNIQUE: Duplex sonography of the LEFT LOWER extremity veins with color and spectral Doppler, with and without compression.    FINDINGS:    There is normal compressibilityof the left common femoral, femoral and popliteal veins.  Doppler examination shows normal spontaneous and phasic flow.    No calf vein thrombosis is detected.    IMPRESSION:  No evidence of left lower extremity deep venous thrombosis.    EXAM:  US DPLX LWR EXT VEINS LTD RT                            PROCEDURE DATE:  03/14/2021          INTERPRETATION:  CLINICAL INFORMATION: Right leg swelling    COMPARISON: None available.    TECHNIQUE: Duplex sonography of the RIGHT LOWER extremity veins with color and spectral Doppler, with and without compression.    FINDINGS:    There is normal compressibility of the right common femoral, femoral and popliteal veins.    Doppler examination shows normal spontaneous and phasic flow.    No calf vein thrombosis is detected.    IMPRESSION:  No evidence of right lower extremity deep venous thrombosis.    Please note, there is limited visualization of the calf veins.  Therefore, if  clinical concern persists, reevaluation can be performed after 5 to 7 days to evaluate for propagation of clot.            JAMES ECHEVERRIA MD; Attending Radiologist  This document has been electronically signed. Mar 14 2021  3:40PM    EXAM:  XR CHEST PORTABLE URGENT 1V                            PROCEDURE DATE:  03/15/2021          INTERPRETATION:  CLINICAL STATEMENT: Follow-up chest pain.    TECHNIQUE: AP view of the chest.    COMPARISON: 3/14/2021    FINDINGS/  IMPRESSION:  Nonspecific round opacity left lung base without significant change. No pleural effusion    Heart size cannot be accurately assessed in this projection.              HEYDI GARDINER MD; Attending Radiologist  This document has been electronically signed. Mar15 2021  1:22PM      Imaging  Reviewed:  YES/NO    Consultant(s) Notes Reviewed:   YES/ No      Plan of care was discussed with patient and /or primary care giver; all questions and concerns were addressed

## 2021-03-17 NOTE — CONSULT NOTE ADULT - ASSESSMENT
To be completed          RECOMMENDATIONS    ESR, CRP, RF, CCP, SPEP, serum immunofixation (not urine, not immunoelectrophoresis), copper, zinc, methylmalonic acid, homocysteine, copper, zinc, CK, b6 level, B1 level, ferritin, PETE, acetylcholine receptor antibody, free T3, free T4, syphilis serology.     No UMN signs.    Remarkably symmetric four-limb weakness worse in LEs, with the only topographic observation being an apparent relative less involvement of plantar flexion than other LE muscle actions.      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  R/O autoimmune disorder/  R/O paraneoplastic syndrome.          Depending on onset of weakness (which could well have been before Pt became aware of it),  AIDP (aka GBS) or CIDP.  This remarkable degree of symmetric weakness is rarely encountered however (short of when there is complete or near-complete plegia, which is not the case here).         RECOMMENDATIONS    ESR, CRP, RF, CCP, SPEP, serum immunofixation (not urine, not immunoelectrophoresis), copper, zinc, methylmalonic acid, homocysteine, copper, zinc, CK, b6 level, B1 level, ferritin, PETE, acetylcholine receptor antibody, free T3, free T4, syphilis serology.     No UMN signs.    Remarkably symmetric four-limb weakness worse in LEs, with the only topographic observation being an apparent relative less involvement of plantar flexion than other LE muscle actions.      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  R/O autoimmune disorder/  R/O paraneoplastic syndrome.          Depending on onset of weakness (which could well have been before Pt became aware of it),  AIDP (aka GBS) or CIDP.  This remarkable degree of symmetric weakness is rarely encountered however (short of when there is complete or near-complete plegia, which is not the case here).        COVID-related neuropathy.        RECOMMENDATIONS    ESR, CRP, RF, CCP, SPEP, serum immunofixation (not urine, not immunoelectrophoresis), copper, zinc, methylmalonic acid, homocysteine, copper, zinc, CK, b6 level, B1 level, ferritin, PETE, acetylcholine receptor antibody, free T3, free T4, syphilis serology.

## 2021-03-17 NOTE — PROGRESS NOTE ADULT - PROBLEM SELECTOR PLAN 1
p/w RLE weakness x5d 2/2 lumbar Radiculopathy  RLE doppler negative  LLE doppler negative   CTH negative   CT lumbar spine as above   pending CT-A  PT recommends JOSE  ortho consulted  neuro consulted   f/u recommendations.

## 2021-03-17 NOTE — CONSULT NOTE ADULT - SUBJECTIVE AND OBJECTIVE BOX
To be completed IN PROCESS      History per Admission H&P of 3/14/21.    <Start of quote from H&P>  "History of Present Illness:   Patient is a 89 year old, AAO x3 and ambulating at baseline, w/ PMH of osteoarthritis of left knee, HTN and HLD, presents with left lower leg weakness for the past 5 days. She states no significant inciting factors, except laying on right-side. She denies any associated pain, but states she is unable to ambulate due to weakness. She endorses nausea, poor oral intake and episode of diarrhea (3x/d) for a week. She denies fever, chills, vomiting, abdominal pain, hematochezia, and melena.       Review of Systems:  · Negative General Symptoms	no fever; no chills; no polyuria; no fatigue  · Skin/Breast	not applicable  · Negative Ophthalmologic Symptoms	no diplopia; no blurred vision R  · Negative ENMT Symptoms	no hearing difficulty; no ear pain; no tinnitus; no vertigo  · Negative Respiratory and Thorax Symptoms	no wheezing; no dyspnea; no cough; no pleuritic chest pain  · Negative Cardiovascular Symptoms	no chest pain; no palpitations; no dyspnea on exertion  · Negative Gastrointestinal Symptoms	no vomiting; no constipation; no abdominal pain; no melena; no hematochezia  · Gastrointestinal Symptoms	nausea; diarrhea  · Negative General Genitourinary Symptoms	no hematuria; no incontinence  · Negative Musculoskeletal Symptoms	no myalgia; no muscle cramps  · Musculoskeletal Symptoms	arthritis; muscle weakness; stiffness  · Negative Neurological Symptoms	no paresthesias; no tremors; no loss of sensation  · Neurological Symptoms	weakness  · Negative Psychiatric Symptoms	no suicidal ideation; no depression  · Negative Hematology Symptoms	no gum bleeding; no nose bleeding  · Negative Endocrine Symptoms	no cold intolerance; no heat intolerance  · Negative Allergic Reactions	no anaphylaxis; no respiratory distress; no photosensitivity      Allergies and Intolerances:        Allergies:  	No Known Allergies:     Home Medications:   * Incomplete Medication History as of 14-Mar-2021 18:15 documented in Structured Notes  · 	cyclobenzaprine 5 mg oral tablet: Last Dose Taken:  , 1 tab(s) orally once a day (at bedtime)   · 	naproxen 500 mg oral tablet: Last Dose Taken:  , 1 tab(s) orally 2 times a day   · 	simvastatin 10 mg oral tablet: Last Dose Taken:  , 1 tab(s) orally once a day (at bedtime)  · 	atenolol 50 mg oral tablet: Last Dose Taken:  , 1 tab(s) orally once a day  · 	chlorthalidone 25 mg oral tablet: Last Dose Taken:  , 1 tab(s) orally once a day  · 	potassium chloride 8 mEq (600 mg) oral tablet, extended release: Last Dose Taken:  , 1 tab(s) orally 3 times a day    .    Patient History:    Past Medical, Past Surgical, and Family History:  PAST MEDICAL HISTORY:  HLD (hyperlipidemia)     HTN (hypertension).     PAST SURGICAL HISTORY:  No significant past surgical history.     Social History:  Social History (marital status, living situation, occupation, tobacco use, alcohol and drug use, and sexual history): smoking: none  alcohol: none  illicit drug: none"  <End of quote from H&P>      EXAMINATION    Awake, alert, recumbent in bed.   History per Admission H&P of 3/14/21.    <Start of quote from H&P>  "History of Present Illness:   Patient is a 89 year old, AAO x3 and ambulating at baseline, w/ PMH of osteoarthritis of left knee, HTN and HLD, presents with left lower leg weakness for the past 5 days. She states no significant inciting factors, except laying on right-side. She denies any associated pain, but states she is unable to ambulate due to weakness. She endorses nausea, poor oral intake and episode of diarrhea (3x/d) for a week. She denies fever, chills, vomiting, abdominal pain, hematochezia, and melena.       Review of Systems:  · Negative General Symptoms	no fever; no chills; no polyuria; no fatigue  · Skin/Breast	not applicable  · Negative Ophthalmologic Symptoms	no diplopia; no blurred vision R  · Negative ENMT Symptoms	no hearing difficulty; no ear pain; no tinnitus; no vertigo  · Negative Respiratory and Thorax Symptoms	no wheezing; no dyspnea; no cough; no pleuritic chest pain  · Negative Cardiovascular Symptoms	no chest pain; no palpitations; no dyspnea on exertion  · Negative Gastrointestinal Symptoms	no vomiting; no constipation; no abdominal pain; no melena; no hematochezia  · Gastrointestinal Symptoms	nausea; diarrhea  · Negative General Genitourinary Symptoms	no hematuria; no incontinence  · Negative Musculoskeletal Symptoms	no myalgia; no muscle cramps  · Musculoskeletal Symptoms	arthritis; muscle weakness; stiffness  · Negative Neurological Symptoms	no paresthesias; no tremors; no loss of sensation  · Neurological Symptoms	weakness  · Negative Psychiatric Symptoms	no suicidal ideation; no depression  · Negative Hematology Symptoms	no gum bleeding; no nose bleeding  · Negative Endocrine Symptoms	no cold intolerance; no heat intolerance  · Negative Allergic Reactions	no anaphylaxis; no respiratory distress; no photosensitivity      Allergies and Intolerances:        Allergies:  	No Known Allergies:     Home Medications:   * Incomplete Medication History as of 14-Mar-2021 18:15 documented in Structured Notes  · 	cyclobenzaprine 5 mg oral tablet: Last Dose Taken:  , 1 tab(s) orally once a day (at bedtime)   · 	naproxen 500 mg oral tablet: Last Dose Taken:  , 1 tab(s) orally 2 times a day   · 	simvastatin 10 mg oral tablet: Last Dose Taken:  , 1 tab(s) orally once a day (at bedtime)  · 	atenolol 50 mg oral tablet: Last Dose Taken:  , 1 tab(s) orally once a day  · 	chlorthalidone 25 mg oral tablet: Last Dose Taken:  , 1 tab(s) orally once a day  · 	potassium chloride 8 mEq (600 mg) oral tablet, extended release: Last Dose Taken:  , 1 tab(s) orally 3 times a day    .    Patient History:    Past Medical, Past Surgical, and Family History:  PAST MEDICAL HISTORY:  HLD (hyperlipidemia)     HTN (hypertension).     PAST SURGICAL HISTORY:  No significant past surgical history.     Social History:  Social History (marital status, living situation, occupation, tobacco use, alcohol and drug use, and sexual history): smoking: none  alcohol: none  illicit drug: none"  <End of quote from H&P>      EXAMINATION    Awake, alert, recumbent in bed.  Obese.  Normal comprehension, expression, prosody, speech articulation.  PERRL; EOMI; visual fields grossly intact.  Normal facial/lingual movements.  Very weak all four extremities, weaker in the LEs, with no demonstrable asymmetry or topographic pattern.  Does not raise either LE up off the bed; does not hold either lower leg up when examiner supports thigh in partial flexion.  Force of plantar flexion is > force of knee flex or extension (either side).  P/LT sensation grossly intact; no extinction on DSS.      Reflex                           Right    Left   Comment    Scapulohumeral               0         0  Pectoralis                        0         0  Biceps                             0         2  Triceps                          ?tr         0  Brachiorad                       0        0  Daily                      absent  absent  Patellar                           0        0  Gastroc                          0        0  Plantar                        flexor  flexor          SELECTED TEST RESULTS    B12/folate  583/11.6  TSH  4.05  D-dimer - values between 2000 and 3500  tot prot/alb  averaging about 6.0/2.3

## 2021-03-17 NOTE — PROGRESS NOTE ADULT - ASSESSMENT
Patient is a 89 year old, AAO x3 and ambulating at baseline, w/ PMH of osteoarthritis of left knee, HTN and HLD, presents with left lower leg weakness for the past 5 days. She states no significant inciting factors, except laying on right-side. Patient admitted to medicine for dehydration and hypokalemia found to be positive COVID-19 by pcr. CT lumbar with L5-S1 suspected impingement of both exiting L5 nerve roots, L4-5 moderate bilateral neural foraminal stenosis. Ortho and Neuro consulted. PT evaluation done with recommendation for rolling walker and JOSE after discharge.            Patient seen and examined at bedside. She denies any associated pain, but states she is unable to ambulate due to weakness. Ortho and Neuro consulted for LE weakness. Unable to perform CT-A yesterday due to IV line infiltrate. Pt schedule for CT-A today with a new IV line.

## 2021-03-17 NOTE — CHART NOTE - NSCHARTNOTEFT_GEN_A_CORE
EVENT: Received telephone call from MD Srivastava reporting that CT called and reported that pt had CT A chest done & IMPRESSION: Multiple bilateral pulmonary emboli to multiple segmental arteries. There is suggestion of right heart strain. Pleural-based mass in the lateral left upper lobe may represent lung infarction.     HPI: Patient is a 89 year old, AAO x3 and ambulating at baseline, w/ PMH of osteoarthritis of left knee, HTN and HLD, presents with left lower leg weakness for the past 5 days. Admitted to r/o stroke.    Subjective: "I feel fine"      OBJECTIVE:  Vital Signs Last 24 Hrs  T(C): 37.2 (17 Mar 2021 21:20), Max: 37.2 (17 Mar 2021 21:20)  T(F): 99 (17 Mar 2021 21:20), Max: 99 (17 Mar 2021 21:20)  HR: 80 (17 Mar 2021 21:20) (70 - 81)  BP: 130/69 (17 Mar 2021 21:20) (111/57 - 152/85)  BP(mean): --  RR: 17 (17 Mar 2021 21:20) (16 - 17)  SpO2: 96% (17 Mar 2021 21:20) (96% - 99%)    FOCUSED PHYSICAL EXAM:  Neuro: awake, alert, oriented x 3. No neuro deficit  Cardiovascular: Pulses +2 B/L in lower and upper extremities, HR regular, BP stable, No edema.  Respiratory: Respirations regular, unlabored, breath sounds clear B/L.   GI: Abdomen soft, non-tender, positive bowel sounds.  : no bladder distention noted. No complaints at this time.  Skin: Dry, intact, no bruising, no diaphoresis.    LABS:                        10.8   9.64  )-----------( 253      ( 17 Mar 2021 07:10 )             33.0     03-17    138  |  106  |  20<H>  ----------------------------<  101<H>  4.8   |  26  |  1.12    Ca    9.2      17 Mar 2021 07:10    TPro  5.9<L>  /  Alb  2.4<L>  /  TBili  0.3  /  DBili  x   /  AST  14  /  ALT  13  /  AlkPhos  78  03-17      EKG:   IMAGING:      ASSESSMENT/Problem: 1). Multiple manfred PE to multiple segmental arteries. There is suggestion of R heart strain most likely 2/2 to + COVID                                    2). Pleural-based mass in the lateral JJ may represent lung infarction most likely 2/2 to + COVID         PLAN:  1. Discuss above findings with attending of record, Dr. Rudi Farfan  2. Cont with present Lovenox 70 mg, SQ, BID (pt's current wt is 73.6 KG)  3. Cont present care/treatment  4. Trend D-Dimer Q 3 days  5. Echo ordered as per Dr Farfan  6. Supportive care

## 2021-03-18 LAB
ANION GAP SERPL CALC-SCNC: 5 MMOL/L — SIGNIFICANT CHANGE UP (ref 5–17)
BUN SERPL-MCNC: 22 MG/DL — HIGH (ref 7–18)
CALCIUM SERPL-MCNC: 8.8 MG/DL — SIGNIFICANT CHANGE UP (ref 8.4–10.5)
CHLORIDE SERPL-SCNC: 108 MMOL/L — SIGNIFICANT CHANGE UP (ref 96–108)
CK SERPL-CCNC: 34 U/L — SIGNIFICANT CHANGE UP (ref 21–215)
CO2 SERPL-SCNC: 27 MMOL/L — SIGNIFICANT CHANGE UP (ref 22–31)
CREAT SERPL-MCNC: 1.12 MG/DL — SIGNIFICANT CHANGE UP (ref 0.5–1.3)
CRP SERPL-MCNC: 26 MG/L — HIGH
ERYTHROCYTE [SEDIMENTATION RATE] IN BLOOD: 39 MM/HR — HIGH (ref 0–20)
FERRITIN SERPL-MCNC: 758 NG/ML — HIGH (ref 15–150)
GLUCOSE SERPL-MCNC: 92 MG/DL — SIGNIFICANT CHANGE UP (ref 70–99)
HCT VFR BLD CALC: 30.3 % — LOW (ref 34.5–45)
HCT VFR BLD CALC: 30.9 % — LOW (ref 34.5–45)
HCYS SERPL-MCNC: 24.8 UMOL/L — HIGH
HGB BLD-MCNC: 10 G/DL — LOW (ref 11.5–15.5)
HGB BLD-MCNC: 9.9 G/DL — LOW (ref 11.5–15.5)
MCHC RBC-ENTMCNC: 28.8 PG — SIGNIFICANT CHANGE UP (ref 27–34)
MCHC RBC-ENTMCNC: 29.2 PG — SIGNIFICANT CHANGE UP (ref 27–34)
MCHC RBC-ENTMCNC: 32.4 GM/DL — SIGNIFICANT CHANGE UP (ref 32–36)
MCHC RBC-ENTMCNC: 32.7 GM/DL — SIGNIFICANT CHANGE UP (ref 32–36)
MCV RBC AUTO: 89 FL — SIGNIFICANT CHANGE UP (ref 80–100)
MCV RBC AUTO: 89.4 FL — SIGNIFICANT CHANGE UP (ref 80–100)
MRSA PCR RESULT.: SIGNIFICANT CHANGE UP
NRBC # BLD: 0 /100 WBCS — SIGNIFICANT CHANGE UP (ref 0–0)
NRBC # BLD: 0 /100 WBCS — SIGNIFICANT CHANGE UP (ref 0–0)
PLATELET # BLD AUTO: 288 K/UL — SIGNIFICANT CHANGE UP (ref 150–400)
PLATELET # BLD AUTO: 306 K/UL — SIGNIFICANT CHANGE UP (ref 150–400)
POTASSIUM SERPL-MCNC: 4.2 MMOL/L — SIGNIFICANT CHANGE UP (ref 3.5–5.3)
POTASSIUM SERPL-SCNC: 4.2 MMOL/L — SIGNIFICANT CHANGE UP (ref 3.5–5.3)
PROT SERPL-MCNC: 5.1 G/DL — LOW (ref 6–8.3)
PROT SERPL-MCNC: 5.1 G/DL — LOW (ref 6–8.3)
RBC # BLD: 3.39 M/UL — LOW (ref 3.8–5.2)
RBC # BLD: 3.47 M/UL — LOW (ref 3.8–5.2)
RBC # FLD: 14.6 % — HIGH (ref 10.3–14.5)
RBC # FLD: 14.8 % — HIGH (ref 10.3–14.5)
RHEUMATOID FACT SERPL-ACNC: <10 IU/ML — SIGNIFICANT CHANGE UP (ref 0–13)
S AUREUS DNA NOSE QL NAA+PROBE: SIGNIFICANT CHANGE UP
SARS-COV-2 RNA SPEC QL NAA+PROBE: SIGNIFICANT CHANGE UP
SODIUM SERPL-SCNC: 140 MMOL/L — SIGNIFICANT CHANGE UP (ref 135–145)
T PALLIDUM AB TITR SER: NEGATIVE — SIGNIFICANT CHANGE UP
T3FREE SERPL-MCNC: 2.02 PG/ML — SIGNIFICANT CHANGE UP (ref 1.8–4.6)
T4 FREE SERPL-MCNC: 0.9 NG/DL — SIGNIFICANT CHANGE UP (ref 0.9–1.8)
WBC # BLD: 9.58 K/UL — SIGNIFICANT CHANGE UP (ref 3.8–10.5)
WBC # BLD: 9.81 K/UL — SIGNIFICANT CHANGE UP (ref 3.8–10.5)
WBC # FLD AUTO: 9.58 K/UL — SIGNIFICANT CHANGE UP (ref 3.8–10.5)
WBC # FLD AUTO: 9.81 K/UL — SIGNIFICANT CHANGE UP (ref 3.8–10.5)

## 2021-03-18 PROCEDURE — 99233 SBSQ HOSP IP/OBS HIGH 50: CPT | Mod: GC

## 2021-03-18 RX ORDER — PANTOPRAZOLE SODIUM 20 MG/1
40 TABLET, DELAYED RELEASE ORAL
Refills: 0 | Status: DISCONTINUED | OUTPATIENT
Start: 2021-03-18 | End: 2021-03-22

## 2021-03-18 RX ORDER — ENOXAPARIN SODIUM 100 MG/ML
75 INJECTION SUBCUTANEOUS
Refills: 0 | Status: DISCONTINUED | OUTPATIENT
Start: 2021-03-18 | End: 2021-03-20

## 2021-03-18 RX ORDER — SODIUM CHLORIDE 9 MG/ML
1000 INJECTION INTRAMUSCULAR; INTRAVENOUS; SUBCUTANEOUS
Refills: 0 | Status: DISCONTINUED | OUTPATIENT
Start: 2021-03-18 | End: 2021-03-22

## 2021-03-18 RX ADMIN — ENOXAPARIN SODIUM 70 MILLIGRAM(S): 100 INJECTION SUBCUTANEOUS at 05:03

## 2021-03-18 RX ADMIN — LIDOCAINE 1 PATCH: 4 CREAM TOPICAL at 12:00

## 2021-03-18 RX ADMIN — SODIUM CHLORIDE 80 MILLILITER(S): 9 INJECTION INTRAMUSCULAR; INTRAVENOUS; SUBCUTANEOUS at 15:15

## 2021-03-18 RX ADMIN — LIDOCAINE 1 PATCH: 4 CREAM TOPICAL at 00:10

## 2021-03-18 RX ADMIN — ATENOLOL 50 MILLIGRAM(S): 25 TABLET ORAL at 05:03

## 2021-03-18 RX ADMIN — Medication 5 MILLIGRAM(S): at 21:51

## 2021-03-18 RX ADMIN — ENOXAPARIN SODIUM 75 MILLIGRAM(S): 100 INJECTION SUBCUTANEOUS at 17:19

## 2021-03-18 RX ADMIN — Medication 650 MILLIGRAM(S): at 01:00

## 2021-03-18 RX ADMIN — ZINC OXIDE 1 APPLICATION(S): 200 OINTMENT TOPICAL at 12:02

## 2021-03-18 RX ADMIN — SIMVASTATIN 10 MILLIGRAM(S): 20 TABLET, FILM COATED ORAL at 21:51

## 2021-03-18 RX ADMIN — LIDOCAINE 1 PATCH: 4 CREAM TOPICAL at 18:54

## 2021-03-18 NOTE — PROGRESS NOTE ADULT - PROBLEM SELECTOR PLAN 1
p/w RLE weakness x5d 2/2 lumbar Radiculopathy  RLE doppler negative  LLE doppler negative   CTH negative   CT lumbar spine as above   CT-A with b/l multisegment PE  PT recommends JOSE  ortho consulted- no surgery conservative treatment  neuro dr. Tucker consulted   f/u recommendations.

## 2021-03-18 NOTE — CONSULT NOTE ADULT - SUBJECTIVE AND OBJECTIVE BOX
Patient is a 89y old  Female who presents with a chief complaint of r/o stroke (18 Mar 2021 15:11)     .     HPI:  HPI:  Patient is a 89 year old, AAO x3 and ambulating at baseline, w/ PMH of osteoarthritis of left knee, HTN and HLD, presents with left lower leg weakness for the past 5 days. She states no significant inciting factors, except laying on right-side. She denies any associated pain, but states she is unable to ambulate due to weakness. She endorses nausea, poor oral intake and episode of diarrhea (3x/d) for a week. She denies fever, chills, vomiting, abdominal pain, hematochezia, and melena.   (14 Mar 2021 18:00)            REVIEW OF SYSTEMS  Constitutional:   No fever, no fatigue, no pallor, no night sweats, no weight loss.  HEENT:   No eye pain, no vision changes, no icterus, no mouth ulcers.  Respiratory:   No shortness of breath, no cough, no respiratory distress.   Cardiovascular:   No chest pain, no palpitations.   Gastrointestinal: No abdominal pain, no nausea, no vomiting , no diahrrea, no constipation, no hematochezia,no melena.  Skin:   No rashes, no jaundice, no eczema.   Musculoskeletal:   No joint pain, no swelling, no myalgia.   Neurologic:   No headache, no seizure, no weakness.   Genitourinary:   No dysuria, no decreased urine output.  Psychiatric:  No depression, no anxiety,   Endocrine:   No thyroid disease, no diabetes.  Heme/Lymphatic:   No anemia, no blood transfusions, no lymph node enlargement, no bleeding, no bruising.  ___________________________________________________________________________________________  Allergies    No Known Allergies    Intolerances      MEDICATIONS  (STANDING):  ATENolol  Tablet 50 milliGRAM(s) Oral daily  enoxaparin Injectable 75 milliGRAM(s) SubCutaneous two times a day  influenza   Vaccine 0.5 milliLiter(s) IntraMuscular once  lidocaine   Patch 1 Patch Transdermal daily  melatonin 5 milliGRAM(s) Oral at bedtime  pantoprazole    Tablet 40 milliGRAM(s) Oral before breakfast  simvastatin 10 milliGRAM(s) Oral at bedtime  sodium chloride 0.9%. 1000 milliLiter(s) (80 mL/Hr) IV Continuous <Continuous>  zinc oxide 20% Ointment 1 Application(s) Topical daily    MEDICATIONS  (PRN):  acetaminophen   Tablet .. 650 milliGRAM(s) Oral every 6 hours PRN Mild Pain (1 - 3), Moderate Pain (4 - 6), Severe Pain (7 - 10)  collagenase Ointment 1 Application(s) Topical daily PRN Decubitus ulcer      PAST MEDICAL & SURGICAL HISTORY:  HLD (hyperlipidemia)    HTN (hypertension)    No significant past surgical history      FAMILY HISTORY:    Social History: No hsitory of : Tobacco use, IVDA, EToH  ______________________________________________________________________________________    PHYSICAL EXAM    Daily     Daily Weight in k.6 (17 Mar 2021 22:37)  BMI: 30.6 (- @ 08:41)  Change in Weight:  Vital Signs Last 24 Hrs  T(C): 37.3 (18 Mar 2021 14:52), Max: 37.3 (18 Mar 2021 14:52)  T(F): 99.2 (18 Mar 2021 14:52), Max: 99.2 (18 Mar 2021 14:52)  HR: 77 (18 Mar 2021 14:52) (70 - 80)  BP: 128/60 (18 Mar 2021 14:52) (122/63 - 130/69)  BP(mean): --  RR: 18 (18 Mar 2021 14:52) (16 - 18)  SpO2: 98% (18 Mar 2021 14:52) (96% - 98%)    General:  Well developed, well nourished, alert and active, no pallor, NAD.  HEENT:    Normal appearance of conjunctiva, ears, nose, lips, oropharynx, and oral mucosa, anicteric.  Neck:  No masses, no asymmetry.  Lymph Nodes:  No lymphadenopathy.   Cardiovascular:  RRR normal S1/S2, no murmur.  Respiratory:  CTA B/L, normal respiratory effort.   Abdominal:   soft, no masses or tenderness, normoactive BS, NT/ND, no HSM.  Extremities:   No clubbing or cyanosis, normal capillary refill, no edema.   Skin:   No rash, jaundice, lesions, eczema.   Musculoskeletal:  No joint swelling, erythema or tenderness.   Neuro: No focal deficits.   Other:   _______________________________________________________________________________________________  Lab Results:                          9.9    9.58  )-----------( 288      ( 18 Mar 2021 07:53 )             30.3         140  |  108  |  22<H>  ----------------------------<  92  4.2   |  27  |  1.12    Ca    8.8      18 Mar 2021 07:53    TPro  5.1<L>  /  Alb  x   /  TBili  x   /  DBili  x   /  AST  x   /  ALT  x   /  AlkPhos  x       LIVER FUNCTIONS - ( 18 Mar 2021 09:43 )  Alb: x     / Pro: 5.1 g/dL / ALK PHOS: x     / ALT: x     / AST: x     / GGT: x             C-Reactive Protein, Serum: 26 mg/L ( @ 10:38)  Sedimentation Rate, Erythrocyte: 39 mm/Hr ( @ 07:53)    CARDIAC MARKERS ( 18 Mar 2021 07:53 )  x     / x     / 34 U/L / x     / x          Stool Results:          RADIOLOGY RESULTS:    SURGICAL PATHOLOGY:

## 2021-03-18 NOTE — PROGRESS NOTE ADULT - PROBLEM SELECTOR PLAN 2
[id number] and switched to Full dose Lovenox  CTA chest with multiple PE bilaterally  pt. on Lovenox 75mg BID  Doppler neg

## 2021-03-18 NOTE — PROGRESS NOTE ADULT - PROBLEM SELECTOR PLAN 4
Covid Negative as of 03/18  currently 98% RA   c/w Isolation Precautions  monitor O2 sat  covid ab positive

## 2021-03-18 NOTE — CONSULT NOTE ADULT - ASSESSMENT
Anemia   No signs of overt bleeding   Not a candidate for EGD and colonoscopy with 1) COVID 2) Right hears strain.   Continue AC with monitoring of CBC   Will follow closely   Advanced care planning was discussed with patient and family.  Advanced care planning forms were reviewed and discussed.  Risks, benefits and alternatives of gastroenterologic procedures were discussed in detail and all questions were answered.    I was physically present for the key portions of the evaluation and management (E/M) service provided.  The patient was personally seen and examined at bedside.  I have edited the note as appropriate.   Thank you for your consultation and allowing  me to participate in the care of your patients. If you have further questions please contact me at [phone].     Raimro Contreras M.D.       _________________________________________________________________________________________________  [facility]  [address] [place] [address]  Office: [phone]    Herber Son PA-C  ___________________________________________________________________________________________________

## 2021-03-18 NOTE — PROGRESS NOTE ADULT - SUBJECTIVE AND OBJECTIVE BOX
NP Note discussed with Primary Attending.    Patient is a 89y old  Female who presents with a chief complaint of r/o stroke (18 Mar 2021 13:41)      INTERVAL HPI/OVERNIGHT EVENTS: no new complaints    MEDICATIONS  (STANDING):  ATENolol  Tablet 50 milliGRAM(s) Oral daily  enoxaparin Injectable 75 milliGRAM(s) SubCutaneous two times a day  influenza   Vaccine 0.5 milliLiter(s) IntraMuscular once  lidocaine   Patch 1 Patch Transdermal daily  melatonin 5 milliGRAM(s) Oral at bedtime  pantoprazole    Tablet 40 milliGRAM(s) Oral before breakfast  simvastatin 10 milliGRAM(s) Oral at bedtime  sodium chloride 0.9%. 1000 milliLiter(s) (80 mL/Hr) IV Continuous <Continuous>  zinc oxide 20% Ointment 1 Application(s) Topical daily    MEDICATIONS  (PRN):  acetaminophen   Tablet .. 650 milliGRAM(s) Oral every 6 hours PRN Mild Pain (1 - 3), Moderate Pain (4 - 6), Severe Pain (7 - 10)  collagenase Ointment 1 Application(s) Topical daily PRN Decubitus ulcer      __________________________________________________  REVIEW OF SYSTEMS:    CONSTITUTIONAL: No fever,   EYES: no acute visual disturbances  NECK: No pain or stiffness  RESPIRATORY: No cough; No shortness of breath  CARDIOVASCULAR: No chest pain, no palpitations  GASTROINTESTINAL: No pain. No nausea or vomiting; No diarrhea   NEUROLOGICAL: No headache or numbness, no tremors  MUSCULOSKELETAL: No joint pain, no muscle pain  GENITOURINARY: no dysuria, no frequency, no hesitancy  PSYCHIATRY: no depression , no anxiety  ALL OTHER  ROS negative        Vital Signs Last 24 Hrs  T(C): 37.3 (18 Mar 2021 14:52), Max: 37.3 (18 Mar 2021 14:52)  T(F): 99.2 (18 Mar 2021 14:52), Max: 99.2 (18 Mar 2021 14:52)  HR: 77 (18 Mar 2021 14:52) (70 - 80)  BP: 128/60 (18 Mar 2021 14:52) (122/63 - 130/69)  BP(mean): --  RR: 18 (18 Mar 2021 14:52) (16 - 18)  SpO2: 98% (18 Mar 2021 14:52) (96% - 98%)    ________________________________________________  PHYSICAL EXAM:  GENERAL: NAD  HEENT: Normocephalic;  conjunctivae and sclerae clear; moist mucous membranes;   NECK : supple  CHEST/LUNG: Clear to auscultation bilaterally with good air entry   HEART: S1 S2  regular; no murmurs, gallops or rubs  ABDOMEN: Soft, Nontender, Nondistended; Bowel sounds present  EXTREMITIES: no cyanosis; no edema; no calf tenderness  SKIN: warm and dry; no rash  NERVOUS SYSTEM:  Awake and alert; Oriented  to place, person and time ; no new deficits    _________________________________________________  LABS:                        9.9    9.58  )-----------( 288      ( 18 Mar 2021 07:53 )             30.3     03-18    140  |  108  |  22<H>  ----------------------------<  92  4.2   |  27  |  1.12    Ca    8.8      18 Mar 2021 07:53    TPro  5.1<L>  /  Alb  x   /  TBili  x   /  DBili  x   /  AST  x   /  ALT  x   /  AlkPhos  x   03-18        CAPILLARY BLOOD GLUCOSE            RADIOLOGY & ADDITIONAL TESTS:      EXAM:  CT ANGIO CHEST (W)AW IC                            PROCEDURE DATE:  03/17/2021          INTERPRETATION:  CLINICAL INFORMATION: Shortness of breath. COVID positive. Lung mass    COMPARISON: Chest x-ray of 3/15/2021    CONTRAST/COMPLICATIONS:  IV Contrast: Omnipaque 350 / 38 cc administered / 62 cc discarded.  Oral Contrast: NONE  Complications: None reported at time of study completion    PROCEDURE:  CT Angiography of the Chest.  Sagittal and coronal reformats were performed as well as 3D(MIP) reconstructions.    FINDINGS:    LUNGS AND AIRWAYS: Patent central airways.  Lungs are remarkable for a pleural-based mass in the lateral left upper lobe suspicious for lung infarction in the setting of pulmonary embolism..  PLEURA: No pleural effusion.  MEDIASTINUM AND WILD: No lymphadenopathy.  VESSELS: Atherosclerotic aortic calcification. Coronary artery calcification. Bilateral multiple filling defects in pulmonary arteries to the upper and lower lobes involving segmental branches. There is probable nonocclusive thrombus in the right main pulmonary artery as well  HEART: Heart size is normal. No pericardial effusion. There is suggestion of right heart strain with relative dilatation of the right ventricle versus the left.  CHEST WALL AND LOWER NECK: Within normal limits.  VISUALIZED UPPER ABDOMEN: Within normal limits.  BONES: Within normal limits.    IMPRESSION:  Multiple bilateral pulmonary emboli to multiple segmental arteries. There is suggestion of right heart strain.  Pleural-based mass in the lateral left upper lobe may represent lung infarction.  results discussed with Dr. Young at 8:15 PM on 3/17/2021        FAVIOLA BARTON MD; Attending Radiologist  This document has been electronically signed. Mar 17 2021  8:14PM    EXAM:  CT LUMBAR SPINE                            PROCEDURE DATE:  03/16/2021          INTERPRETATION:  INDICATIONS:  Low back pain. Leg weakness.    TECHNIQUE:  Serial axial images were obtained using multislice helical technique. Sagittal and coronal reformatted images were performed.    COMPARISON EXAMINATION: None available at this time.    FINDINGS:    Normal vertebral body height.    ALIGNMENT: Alignment is anatomic. The lumbosacral angle is mildly indistinct.    L1-L2:  Mild loss of posterior disc height. Minimal degenerative retrolisthesis. Mild broad-based annular bulge. Mild canal and bilateral neural foraminal stenosis.    L2-L3:  Mild loss of posterior disc height. Mild broad-based annular bulge with mild canal and bilateral neural foraminal stenosis.    L3-L4:  Moderate disc space narrowing. Minimal degenerative retrolisthesis. Mild broad-based disc bulge. Mild-to-moderate canal stenosis. Mild-to-moderate bilateral neural foraminal stenosis.    L4-L5:  Moderate loss of mostly posterior disc height. Mild broad-based disc bulge. Mild-to-moderate canal stenosis suspected. Mild facet and ligamentous hypertrophy. Moderate bilateral neural foraminal stenosis.    L5-S1:  Vacuum disc phenomenon. Moderate to severe disc space narrowing worse in the posterior disc space. Moderate to severe bilateral facet hypertrophy with bilateral vacuum joint phenomenon. Suspected moderate AP and transverse canal stenosis. Moderate to severe bilateral neural foraminal stenosis likely impinging both exiting L5 nerve roots.    MISCELLANEOUS:  None.    IMPRESSION:    L5-S1 suspected impingement of both exiting L5 nerve roots.    L4-5 moderate bilateral neural foraminal stenosis.    MRI may provide helpful additional information, if clinically indicated.            BERTHA RAMÍREZ MD, Attending Radiologist  This document has been electronically signed. Mar 16 2021  4:20PM      EXAM:  CT BRAIN                            PROCEDURE DATE:  03/16/2021          INTERPRETATION:  CLINICAL INDICATION: Rule out stroke. Covid-19.      TECHNIQUE: Serial axial images were obtained from the skull base to the vertex without the use of intravenous contrast.  Coronal and sagittal reformations were made.    COMPARISON EXAMINATION:?None available at this time.    FINDINGS:    VENTRICLES AND SULCI: Mildly prominent ventricles and sulci unremarkable for patients age.  INTRA-AXIAL: Non-specific white matter decreased attenuation, likely microvascular disease. No intracranial mass, acute hemorrhage, or midline shift is present.  Basal cisterns are patent.  EXTRA-AXIAL: No extra-axial fluid collection is present.  ORBITS: Unremarkable  VISUALIZED SINUSES: No air-fluid levels are identified.  VISUALIZED MASTOIDS: Clear  CALVARIUM: Intact  MISCELLANEOUS:?Bilateral cataract surgery.    IMPRESSION:    Unremarkable head CT, no acute hemorrhage or midline shift. If symptoms persist consider follow up head CT or MRI if no contraindications.            BERTHA RAMÍREZ MD, Attending Radiologist  This document has been electronically signed. Mar 16 2021  3:57PM    EXAM:  US DPLX LWR EXT VEINS LTD LT                            PROCEDURE DATE:  03/15/2021          INTERPRETATION:  CLINICAL INFORMATION: 89-year-old female with COVID-19 infection and elevated D-dimer and left lower extremity pain. Evaluate for left lower extremity deep venous thrombosis.    COMPARISON: None available.    TECHNIQUE: Duplex sonography of the LEFT LOWER extremity veins with color and spectral Doppler, with and without compression.    FINDINGS:    There is normal compressibilityof the left common femoral, femoral and popliteal veins.  Doppler examination shows normal spontaneous and phasic flow.    No calf vein thrombosis is detected.    IMPRESSION:  No evidence of left lower extremity deep venous thrombosis.    Imaging  Reviewed:  YES/NO    Consultant(s) Notes Reviewed:   YES/ No      Plan of care was discussed with patient and /or primary care giver; all questions and concerns were addressed

## 2021-03-18 NOTE — PROGRESS NOTE ADULT - ASSESSMENT
BL PE with possible right heart strain  COVID 19 infection  BL L5 Nerve root impingement   Normocytic anemia  HTN  HLD  JORDI resolved     Plan:  Cont Full dose Lovenox  TTE  Cont IVF to avoid contrast induced nephropathy  Cont Atenolol with holding parameter  FOBT, repeat CBC  PPI  GI consult   MRI if Neurology approves  Melatonin for insomnia  GOC  BL PE with possible right heart strain  COVID 19 infection  BL LE weakness  BL L5 Nerve root impingement   Normocytic anemia  HTN  HLD  JORDI resolved     Plan:  Cont Full dose Lovenox  TTE  Cont IVF to avoid contrast induced nephropathy  Cont Atenolol with holding parameter  FOBT, repeat CBC  PPI  GI consult   Discussed with Dr Hudson, features could be suggestive of GBS vs CIDP, patient will benefit from LP but can not hold AC due to acute PE  Will monitor NIF daily  Melatonin for insomnia  GOC

## 2021-03-18 NOTE — PROGRESS NOTE ADULT - SUBJECTIVE AND OBJECTIVE BOX
Patient is a 89y old  Female who presents with a chief complaint LE weakness (17 Mar 2021 18:21)    Patient was seen and examined at bedside   Denies any complains     INTERVAL HPI/OVERNIGHT EVENTS:  T(C): 37.2 (03-18-21 @ 04:42), Max: 37.2 (03-17-21 @ 21:20)  HR: 70 (03-18-21 @ 04:42) (70 - 81)  BP: 122/63 (03-18-21 @ 04:42) (122/63 - 152/85)  RR: 16 (03-18-21 @ 04:42) (16 - 17)  SpO2: 97% (03-18-21 @ 04:42) (96% - 99%)  Wt(kg): --  I&O's Summary      REVIEW OF SYSTEMS: denies fever, chills, SOB, palpitations, chest pain, abdominal pain, nausea, vomiting, diarrhea, constipation, dizziness    MEDICATIONS  (STANDING):  ATENolol  Tablet 50 milliGRAM(s) Oral daily  enoxaparin Injectable 75 milliGRAM(s) SubCutaneous two times a day  influenza   Vaccine 0.5 milliLiter(s) IntraMuscular once  lidocaine   Patch 1 Patch Transdermal daily  melatonin 5 milliGRAM(s) Oral at bedtime  simvastatin 10 milliGRAM(s) Oral at bedtime  zinc oxide 20% Ointment 1 Application(s) Topical daily    MEDICATIONS  (PRN):  acetaminophen   Tablet .. 650 milliGRAM(s) Oral every 6 hours PRN Mild Pain (1 - 3), Moderate Pain (4 - 6), Severe Pain (7 - 10)  collagenase Ointment 1 Application(s) Topical daily PRN Decubitus ulcer      PHYSICAL EXAM:  GENERAL: NAD, well-groomed, well-developed  NERVOUS SYSTEM:  Alert & Oriented X3, Good concentration; Motor Strength 3/5 B/L lower extremities  CHEST/LUNG: Clear to auscultation bilaterally; No rales, rhonchi, wheezing, or rubs  HEART: Regular rate and rhythm; No murmurs, rubs, or gallops  ABDOMEN: Soft, Nontender, Nondistended; Bowel sounds present  EXTREMITIES:  2+ Peripheral Pulses, No clubbing, cyanosis, or edema  SKIN: No rashes or lesions    LABS:                        9.9    9.58  )-----------( 288      ( 18 Mar 2021 07:53 )             30.3     140  |  108  |  22<H>  ----------------------------<  92  4.2   |  27  |  1.12    Ca    8.8      18 Mar 2021 07:53    TPro  5.1<L>  /  Alb  x   /  TBili  x   /  DBili  x   /  AST  x   /  ALT  x   /  AlkPhos  x   03-18        CAPILLARY BLOOD GLUCOSE

## 2021-03-18 NOTE — PROGRESS NOTE ADULT - ASSESSMENT
Patient is a 89 year old, AAO x3 and ambulating at baseline, w/ PMH of osteoarthritis of left knee, HTN and HLD, presents with left lower leg weakness for the past 5 days. She states no significant inciting factors, except laying on right-side. Patient admitted to medicine for dehydration and hypokalemia found to be positive COVID-19 by pcr. CT lumbar with L5-S1 suspected impingement of both exiting L5 nerve roots, L4-5 moderate bilateral neural foraminal stenosis. Ortho and Neuro consulted. PT evaluation done with recommendation for rolling walker and JOSE after discharge.      Patient seen and examined at bedside. She denies any associated pain, but states she is unable to ambulate due to weakness. Ortho and Neuro consulted for LE weakness. CT-A resulted with multiple bilateral PE to multiple segmental arteries. There is suggestion of right heart strain. Patient will c/w Lovenox 75mg BID and scheduled for Echo. INF ordered.

## 2021-03-19 LAB
% ALBUMIN: 44.3 % — SIGNIFICANT CHANGE UP
% ALPHA 1: 9.1 % — SIGNIFICANT CHANGE UP
% ALPHA 2: 17.6 % — SIGNIFICANT CHANGE UP
% BETA: 14.1 % — SIGNIFICANT CHANGE UP
% GAMMA: 14.9 % — SIGNIFICANT CHANGE UP
% M SPIKE: 1 % — SIGNIFICANT CHANGE UP
ALBUMIN SERPL ELPH-MCNC: 2.3 G/DL — LOW (ref 3.6–5.5)
ALBUMIN/GLOB SERPL ELPH: 0.8 RATIO — SIGNIFICANT CHANGE UP
ALPHA1 GLOB SERPL ELPH-MCNC: 0.5 G/DL — HIGH (ref 0.1–0.4)
ALPHA2 GLOB SERPL ELPH-MCNC: 0.9 G/DL — SIGNIFICANT CHANGE UP (ref 0.5–1)
ANION GAP SERPL CALC-SCNC: 4 MMOL/L — LOW (ref 5–17)
B-GLOBULIN SERPL ELPH-MCNC: 0.7 G/DL — SIGNIFICANT CHANGE UP (ref 0.5–1)
BUN SERPL-MCNC: 21 MG/DL — HIGH (ref 7–18)
CALCIUM SERPL-MCNC: 8.7 MG/DL — SIGNIFICANT CHANGE UP (ref 8.4–10.5)
CHLORIDE SERPL-SCNC: 111 MMOL/L — HIGH (ref 96–108)
CO2 SERPL-SCNC: 27 MMOL/L — SIGNIFICANT CHANGE UP (ref 22–31)
CREAT SERPL-MCNC: 1.05 MG/DL — SIGNIFICANT CHANGE UP (ref 0.5–1.3)
GAMMA GLOBULIN: 0.8 G/DL — SIGNIFICANT CHANGE UP (ref 0.6–1.6)
GLUCOSE SERPL-MCNC: 88 MG/DL — SIGNIFICANT CHANGE UP (ref 70–99)
HCT VFR BLD CALC: 29.3 % — LOW (ref 34.5–45)
HGB BLD-MCNC: 9.6 G/DL — LOW (ref 11.5–15.5)
INTERPRETATION SERPL IFE-IMP: SIGNIFICANT CHANGE UP
M-SPIKE: 0.1 G/DL — HIGH (ref 0–0)
MCHC RBC-ENTMCNC: 29.3 PG — SIGNIFICANT CHANGE UP (ref 27–34)
MCHC RBC-ENTMCNC: 32.8 GM/DL — SIGNIFICANT CHANGE UP (ref 32–36)
MCV RBC AUTO: 89.3 FL — SIGNIFICANT CHANGE UP (ref 80–100)
NRBC # BLD: 0 /100 WBCS — SIGNIFICANT CHANGE UP (ref 0–0)
PLATELET # BLD AUTO: 284 K/UL — SIGNIFICANT CHANGE UP (ref 150–400)
POTASSIUM SERPL-MCNC: 4.9 MMOL/L — SIGNIFICANT CHANGE UP (ref 3.5–5.3)
POTASSIUM SERPL-SCNC: 4.9 MMOL/L — SIGNIFICANT CHANGE UP (ref 3.5–5.3)
PROT PATTERN SERPL ELPH-IMP: SIGNIFICANT CHANGE UP
RBC # BLD: 3.28 M/UL — LOW (ref 3.8–5.2)
RBC # FLD: 14.9 % — HIGH (ref 10.3–14.5)
SARS-COV-2 RNA SPEC QL NAA+PROBE: SIGNIFICANT CHANGE UP
SODIUM SERPL-SCNC: 142 MMOL/L — SIGNIFICANT CHANGE UP (ref 135–145)
WBC # BLD: 9.15 K/UL — SIGNIFICANT CHANGE UP (ref 3.8–10.5)
WBC # FLD AUTO: 9.15 K/UL — SIGNIFICANT CHANGE UP (ref 3.8–10.5)

## 2021-03-19 PROCEDURE — 99233 SBSQ HOSP IP/OBS HIGH 50: CPT | Mod: GC

## 2021-03-19 PROCEDURE — 99233 SBSQ HOSP IP/OBS HIGH 50: CPT

## 2021-03-19 RX ADMIN — LIDOCAINE 1 PATCH: 4 CREAM TOPICAL at 20:43

## 2021-03-19 RX ADMIN — LIDOCAINE 1 PATCH: 4 CREAM TOPICAL at 11:22

## 2021-03-19 RX ADMIN — ENOXAPARIN SODIUM 75 MILLIGRAM(S): 100 INJECTION SUBCUTANEOUS at 06:02

## 2021-03-19 RX ADMIN — ENOXAPARIN SODIUM 75 MILLIGRAM(S): 100 INJECTION SUBCUTANEOUS at 17:31

## 2021-03-19 RX ADMIN — SODIUM CHLORIDE 80 MILLILITER(S): 9 INJECTION INTRAMUSCULAR; INTRAVENOUS; SUBCUTANEOUS at 06:05

## 2021-03-19 RX ADMIN — ZINC OXIDE 1 APPLICATION(S): 200 OINTMENT TOPICAL at 11:22

## 2021-03-19 RX ADMIN — ATENOLOL 50 MILLIGRAM(S): 25 TABLET ORAL at 06:02

## 2021-03-19 RX ADMIN — LIDOCAINE 1 PATCH: 4 CREAM TOPICAL at 23:00

## 2021-03-19 RX ADMIN — Medication 5 MILLIGRAM(S): at 21:51

## 2021-03-19 RX ADMIN — PANTOPRAZOLE SODIUM 40 MILLIGRAM(S): 20 TABLET, DELAYED RELEASE ORAL at 06:02

## 2021-03-19 RX ADMIN — SIMVASTATIN 10 MILLIGRAM(S): 20 TABLET, FILM COATED ORAL at 21:51

## 2021-03-19 RX ADMIN — LIDOCAINE 1 PATCH: 4 CREAM TOPICAL at 00:33

## 2021-03-19 NOTE — PROGRESS NOTE ADULT - PROBLEM SELECTOR PLAN 7
1251 Right ethmoid for culture: anaerobic, aerobic, AFB, Gram stain, and fungal to pathology via ORA.            Left ethmoid for culture: anaerobic, aerobic, AFB, Gram stain, and fungal to pathology via ORA.  
note to have Cr 1.46, likely at baseline  Cr 1.05 today   monitor renal fx
- note to have Cr 1.46, likely at baseline  - eGFR 37  - monitor renal fx
- note to have Cr 1.46, likely at baseline  - eGFR 37  - monitor renal fx
note to have Cr 1.46, likely at baseline  eGFR 37   monitor renal fx
note to have Cr 1.46, likely at baseline  eGFR 37   monitor renal fx

## 2021-03-19 NOTE — PROGRESS NOTE ADULT - ASSESSMENT
Patient is a 89 year old, AAO x3 and ambulating at baseline, w/ PMH of osteoarthritis of left knee, HTN and HLD, presents with left lower leg weakness for the past 5 days. She states no significant inciting factors, except laying on right-side. Patient admitted to medicine for dehydration and hypokalemia found to be positive COVID-19 by pcr. CT lumbar with L5-S1 suspected impingement of both exiting L5 nerve roots, L4-5 moderate bilateral neural foraminal stenosis. Ortho and Neuro consulted. PT evaluation done with recommendation for rolling walker and JOSE after discharge.      Patient seen and examined at bedside. She denies any associated pain, but states she is unable to ambulate due to weakness. Ortho and Neuro consulted for LE weakness. CT-A resulted with multiple bilateral PE to multiple segmental arteries. There is suggestion of right heart strain. Patient will c/w Lovenox 75mg BID and scheduled for Echo. INF ordered.    3/19 patient x2 COVID negative off isolation as per infection control

## 2021-03-19 NOTE — PROGRESS NOTE ADULT - PROBLEM SELECTOR PLAN 4
Covid Negative as of 03/18  currently 98% RA   d/c Isolation Precautions as per infection control  monitor O2 sat  covid ab positive

## 2021-03-19 NOTE — PROGRESS NOTE ADULT - PROBLEM SELECTOR PROBLEM 2
Osteoarthritis of knee, unilateral
Elevated d-dimer
Osteoarthritis of knee, unilateral
Elevated d-dimer
Osteoarthritis of knee, unilateral

## 2021-03-19 NOTE — PROGRESS NOTE ADULT - PROBLEM SELECTOR PLAN 8
- h/o HLD, on statin  - c/w statin  - f/u lipid panel
h/o HLD, on statin  c/w statin  f/u lipid panel
h/o HLD, on statin  c/w statin  f/u lipid panel
- h/o HLD, on statin  - c/w statin  - f/u lipid panel
h/o HLD, on statin  c/w statin  f/u lipid panel

## 2021-03-19 NOTE — PROGRESS NOTE ADULT - SUBJECTIVE AND OBJECTIVE BOX
Patient is a 89y old  Female who presents with a chief complaint of r/o stroke (19 Mar 2021 15:36)    Patient was seen and examined at bedside  Denies any complain    INTERVAL HPI/OVERNIGHT EVENTS:  T(C): 36.3 (03-19-21 @ 12:49), Max: 37.3 (03-18-21 @ 20:39)  HR: 85 (03-19-21 @ 12:49) (85 - 90)  BP: 159/66 (03-19-21 @ 12:49) (136/52 - 159/66)  RR: 16 (03-19-21 @ 12:49) (16 - 17)  SpO2: 100% (03-19-21 @ 12:49) (94% - 100%)  Wt(kg): --  I&O's Summary    18 Mar 2021 07:01  -  19 Mar 2021 07:00  --------------------------------------------------------  IN: 400 mL / OUT: 0 mL / NET: 400 mL        REVIEW OF SYSTEMS: denies fever, chills, SOB, palpitations, chest pain, abdominal pain, nausea, vomitting, diarrhea, constipation, dizziness    MEDICATIONS  (STANDING):  ATENolol  Tablet 50 milliGRAM(s) Oral daily  enoxaparin Injectable 75 milliGRAM(s) SubCutaneous two times a day  influenza   Vaccine 0.5 milliLiter(s) IntraMuscular once  lidocaine   Patch 1 Patch Transdermal daily  melatonin 5 milliGRAM(s) Oral at bedtime  pantoprazole    Tablet 40 milliGRAM(s) Oral before breakfast  simvastatin 10 milliGRAM(s) Oral at bedtime  sodium chloride 0.9%. 1000 milliLiter(s) (80 mL/Hr) IV Continuous <Continuous>  zinc oxide 20% Ointment 1 Application(s) Topical daily    MEDICATIONS  (PRN):  acetaminophen   Tablet .. 650 milliGRAM(s) Oral every 6 hours PRN Mild Pain (1 - 3), Moderate Pain (4 - 6), Severe Pain (7 - 10)  collagenase Ointment 1 Application(s) Topical daily PRN Decubitus ulcer      PHYSICAL EXAM:  GENERAL: NAD, well-groomed, well-developed  HEAD:  Atraumatic, Normocephalic  EYES: EOMI, PERRLA, conjunctiva and sclera clear  ENMT: No tonsillar erythema, exudates, or enlargement; Moist mucous membranes, Good dentition, No lesions  NECK: Supple, No JVD, Normal thyroid  NERVOUS SYSTEM:  Alert & Oriented X3, Good concentration; Motor Strength 5/5 B/L upper and lower extremities; DTRs 2+ intact and symmetric  CHEST/LUNG: Clear to percussion bilaterally; No rales, rhonchi, wheezing, or rubs  HEART: Regular rate and rhythm; No murmurs, rubs, or gallops  ABDOMEN: Soft, Nontender, Nondistended; Bowel sounds present  EXTREMITIES:  2+ Peripheral Pulses, No clubbing, cyanosis, or edema  LYMPH: No lymphadenopathy noted  SKIN: No rashes or lesions  LABS:                        9.6    9.15  )-----------( 284      ( 19 Mar 2021 08:17 )             29.3     03-19    142  |  111<H>  |  21<H>  ----------------------------<  88  4.9   |  27  |  1.05    Ca    8.7      19 Mar 2021 08:17    TPro  5.1<L>  /  Alb  2.3<L>  /  TBili  x   /  DBili  x   /  AST  x   /  ALT  x   /  AlkPhos  x   03-18        CAPILLARY BLOOD GLUCOSE             Patient is a 89y old  Female who presents with a chief complaint of r/o stroke (19 Mar 2021 15:36)    Patient was seen and examined at bedside  Denies any complain  No active bleeding    INTERVAL HPI/OVERNIGHT EVENTS:  T(C): 36.3 (03-19-21 @ 12:49), Max: 37.3 (03-18-21 @ 20:39)  HR: 85 (03-19-21 @ 12:49) (85 - 90)  BP: 159/66 (03-19-21 @ 12:49) (136/52 - 159/66)  RR: 16 (03-19-21 @ 12:49) (16 - 17)  SpO2: 100% (03-19-21 @ 12:49) (94% - 100%)  Wt(kg): --  I&O's Summary    18 Mar 2021 07:01  -  19 Mar 2021 07:00  --------------------------------------------------------  IN: 400 mL / OUT: 0 mL / NET: 400 mL        REVIEW OF SYSTEMS: denies fever, chills, SOB, palpitations, chest pain, abdominal pain, nausea, vomitting, diarrhea, constipation, dizziness    MEDICATIONS  (STANDING):  ATENolol  Tablet 50 milliGRAM(s) Oral daily  enoxaparin Injectable 75 milliGRAM(s) SubCutaneous two times a day  influenza   Vaccine 0.5 milliLiter(s) IntraMuscular once  lidocaine   Patch 1 Patch Transdermal daily  melatonin 5 milliGRAM(s) Oral at bedtime  pantoprazole    Tablet 40 milliGRAM(s) Oral before breakfast  simvastatin 10 milliGRAM(s) Oral at bedtime  sodium chloride 0.9%. 1000 milliLiter(s) (80 mL/Hr) IV Continuous <Continuous>  zinc oxide 20% Ointment 1 Application(s) Topical daily    MEDICATIONS  (PRN):  acetaminophen   Tablet .. 650 milliGRAM(s) Oral every 6 hours PRN Mild Pain (1 - 3), Moderate Pain (4 - 6), Severe Pain (7 - 10)  collagenase Ointment 1 Application(s) Topical daily PRN Decubitus ulcer      PHYSICAL EXAM:  GENERAL: NAD, well-groomed, well-developed  NERVOUS SYSTEM:  Alert & Oriented X3, Able to flex left knee today and strength has increased somehow with plantar and dorsiflexion  CHEST/LUNG: Clear to auscultation bilaterally; No rales, rhonchi, wheezing, or rubs  HEART: Regular rate and rhythm; No murmurs, rubs, or gallops  ABDOMEN: Soft, Nontender, Nondistended; Bowel sounds present  EXTREMITIES:  2+ Peripheral Pulses, No clubbing, cyanosis, or edema  SKIN: No rashes or lesions    LABS:                        9.6    9.15  )-----------( 284      ( 19 Mar 2021 08:17 )             29.3     03-19    142  |  111<H>  |  21<H>  ----------------------------<  88  4.9   |  27  |  1.05    Ca    8.7      19 Mar 2021 08:17    TPro  5.1<L>  /  Alb  2.3<L>  /  TBili  x   /  DBili  x   /  AST  x   /  ALT  x   /  AlkPhos  x   03-18        CAPILLARY BLOOD GLUCOSE

## 2021-03-19 NOTE — PROGRESS NOTE ADULT - SUBJECTIVE AND OBJECTIVE BOX
This is in follow-up to the initial Neurology Consult response of 3/17/21.  Refer to that note for history, and for exam findings at that time.  Pt responds that she feels a bit stronger in her LEs      On examination now:    With the examiner supporting Pt's thigh in ~40 degrees of flexion at the hip, Pt can hold the lower leg against gravity, and against some slight resistance.  This is better than on 3/17 when she could not the lower up at all.  Due to continuing pain in the R knee, did not test leg holding in the RLE.  Plantar flexion is at least as good as it was previusly.      On sensory examination P and LT sensation again noted to be normal.      Vibration sensation is WNL for age in the fingers, markedly reduce at the knees, and absent (fork struck hard) in the toes.      Proprioception WNL for age in the fingers; she detects slow displacement in the toes after 30 degrees of motion (mild to moderate impairment, and typical in her age groups).          Results of previously recommended, and other relevant, lab tests:    ESR/CRP  RF/CCP  SPEP    Serum immunofixation    Copper  Zinc  Methylmalonic acid/homocysteine  CK  Vitamin B6 level  Vitamin B1 level  Ferritin  PETE  Acetylcholine receptor antibody  Free T3/free T4  Syphilis serology.       This is in follow-up to the initial Neurology Consult response of 3/17/21.  Refer to that note for history, and for exam findings at that time.  Pt responds that she feels a bit stronger in her LEs      On examination now:    With the examiner supporting Pt's thigh in ~40 degrees of flexion at the hip, Pt can hold the lower leg against gravity, and against some slight resistance.  This is better than on 3/17 when she could not the lower up at all.  Due to continuing pain in the R knee, did not test leg holding in the RLE.  Plantar flexion is at least as good as it was previusly.      On sensory examination P and LT sensation again noted to be normal.      Vibration sensation is WNL for age in the fingers, markedly reduce at the knees, and absent (fork struck hard) in the toes.      Proprioception WNL for age in the fingers; she detects slow displacement in the toes after 30 degrees of motion (mild to moderate impairment, and typical in her age groups).          Results of previously recommended, and other relevant, lab tests:    ESR/CRP  39/26  RF/CCP   neg/ not ordered    SPEP      Tot prot/alb  5.1/2.3    alpha 1  0.5 (0.1-0.4)    alpha 2  0.9    beta      0.7    gamma   0.8    M-spike  0.1 (0.0-0.0)    Interpretation: weak gamma migrating paraprotein           Serum immunofixation: weak IgG lambda band    Copper  pending  Zinc     98  Methylmalonic acid/homocysteine   pending/24.8  CK  34  Vitamin B6 level  pending  Vitamin B1 level  pending  Ferritin  902, 795, 758  has been decreasing  PETE  1:80 speckled  Acetylcholine receptor antibody  pending  Free T3/free T4  2.02 (1.8-4.6)/0.9 (0.9-1.8  with TSH 4.05 (0.34-4.82)  Syphilis serology neg   This is in follow-up to the initial Neurology Consult response of 3/17/21.  Refer to that note for history, and for exam findings at that time.  Pt responds that she feels a bit stronger in her LEs      On examination now:    With the examiner supporting Pt's thigh in ~40 degrees of flexion at the hip, Pt can hold the lower leg against gravity, and against some slight resistance.  This is better than on 3/17 when she could not the lower up at all.  Due to continuing pain in the R knee, did not test leg holding in the RLE.  Plantar flexion is at least as good as it was previusly.      On sensory examination P and LT sensation again noted to be normal.      Vibration sensation is WNL for age in the fingers, markedly reduce at the knees, and absent (fork struck hard) in the toes.      Proprioception WNL for age in the fingers; she detects slow displacement in the toes after 30 degrees of motion (mild to moderate impairment, and typical in her age groups).          Results of previously recommended, and other relevant, lab tests:    ESR/CRP  39/26  RF/CCP   neg/ not ordered    SPEP      Tot prot/alb  5.1/2.3    alpha 1  0.5 (0.1-0.4)    alpha 2  0.9    beta      0.7    gamma   0.8    M-spike  0.1 (0.0-0.0)    Interpretation: weak gamma migrating paraprotein           Serum immunofixation: weak IgG lambda band    Copper  pending  Zinc     98  Methylmalonic acid/homocysteine   pending/24.8  with B12/folate  583/11.6  CK  34  Vitamin B6 level  pending  Vitamin B1 level  pending  Ferritin  902, 795, 758  has been decreasing  PETE  1:80 speckled  Acetylcholine receptor antibody  pending  Free T3/free T4  2.02 (1.8-4.6)/0.9 (0.9-1.8  with TSH 4.05 (0.34-4.82)  Syphilis serology neg

## 2021-03-19 NOTE — PROGRESS NOTE ADULT - PROBLEM SELECTOR PROBLEM 3
Elevated d-dimer
Elevated d-dimer
Osteoarthritis of knee, unilateral
Osteoarthritis of knee, unilateral
Elevated d-dimer

## 2021-03-19 NOTE — PROGRESS NOTE ADULT - ASSESSMENT
Lab test abnormalities raise several possible etiologies for peripheral neuropathy.     1)  High homocysteine: implies a functional deficiency of folic acid or B12 or both.  If methylmalonic acid level turns out to be high then there is a B12 deficiency (+/- folate deficiency) irrespective of the B12 and folate levels.    The assay for B12 does not measure B12 level directly.  False elevations (to or above the normal range) occur with pernicious anemia due to intrinsic factor antibodies, which may or may not be detected by the antibody test.  B12-like compounds of vegetable origin (a problem with vegans) without cobalamin activity also can lead to falsely elevated determinations.  Methylmalonic acid (MMA) and homocysteine (Hcy) determinations are necessary to elucidate what is happening.    Irrespective of B12 and folate levels:       high Hcy w normal MMA implies folate deficiency;        high MMA and Hcy implies B12 deficiency +/- folate deficiency.    2)  The M-spike and IgG lambda band raise concern for an autoimmune neuropathy, including amyloidosis.     3)  Positive PETE, although the titer is not high, is of concern because of the speckled pattern.  R/O SLE or other systemic autoimmune disorder.      Having one autoimmune disorder raises the risk for having other autoimmune disorders.        RECOMMENDATIONS FOR ADDITIONAL TESTS    Repeat PETE.  Intrinsic factor ab, parietal cell ab, complement C3 and C4, lupus anticoagulant, cryoglobulins, urine immunofixation, anti DS-DNA, immunoglobulin free light chains (kappa, lambda),  anti-contactin 1 (IgG1, IgG3, IgG4).

## 2021-03-19 NOTE — PROGRESS NOTE ADULT - ASSESSMENT
BL PE with possible right heart strain  COVID 19 infection  BL LE weakness concern for GBS vs CIDP  BL L5 Nerve root impingement   Normocytic anemia  HTN  HLD  JORDI resolved     Plan:  Cont Full dose Lovenox  TTE noted, RV systolic function normal  Cont Atenolol with holding parameter  Discussed with Dr Hudson, LP can be deferred for now, would consider if patient had progressive worsening of symptoms. Will follow official recommendations  Hb stable, cont to monitor  GI consult appreciated  PPI  Will monitor NIF daily  GOC discussed full code  Pending placement to [facility]   BL PE with possible right heart strain  COVID 19 infection  BL LE weakness concern for GBS vs CIDP vs COVID associated Neuropathy  BL L5 Nerve root impingement   Normocytic anemia  HTN  HLD  JORDI resolved     Plan:  Cont Full dose Lovenox  TTE noted, RV systolic function normal  Cont Atenolol with holding parameter  Discussed with Dr Hudson, LP can be deferred for now, would consider if patient had progressive worsening of symptoms. Will follow official recommendations  Hb stable, cont to monitor  GI consult appreciated  PPI  Will monitor NIF daily  GOC discussed full code  Pending placement to [facility]

## 2021-03-19 NOTE — PROGRESS NOTE ADULT - SUBJECTIVE AND OBJECTIVE BOX
NP Note discussed with Primary Attending.    Patient is a 89y old  Female who presents with a chief complaint of r/o stroke (18 Mar 2021 16:38)      INTERVAL HPI/OVERNIGHT EVENTS: no new complaints    MEDICATIONS  (STANDING):  ATENolol  Tablet 50 milliGRAM(s) Oral daily  enoxaparin Injectable 75 milliGRAM(s) SubCutaneous two times a day  influenza   Vaccine 0.5 milliLiter(s) IntraMuscular once  lidocaine   Patch 1 Patch Transdermal daily  melatonin 5 milliGRAM(s) Oral at bedtime  pantoprazole    Tablet 40 milliGRAM(s) Oral before breakfast  simvastatin 10 milliGRAM(s) Oral at bedtime  sodium chloride 0.9%. 1000 milliLiter(s) (80 mL/Hr) IV Continuous <Continuous>  zinc oxide 20% Ointment 1 Application(s) Topical daily    MEDICATIONS  (PRN):  acetaminophen   Tablet .. 650 milliGRAM(s) Oral every 6 hours PRN Mild Pain (1 - 3), Moderate Pain (4 - 6), Severe Pain (7 - 10)  collagenase Ointment 1 Application(s) Topical daily PRN Decubitus ulcer      __________________________________________________  REVIEW OF SYSTEMS:    CONSTITUTIONAL: No fever,   EYES: no acute visual disturbances  NECK: No pain or stiffness  RESPIRATORY: No cough; No shortness of breath  CARDIOVASCULAR: No chest pain, no palpitations  GASTROINTESTINAL: No pain. No nausea or vomiting; No diarrhea   NEUROLOGICAL: No headache or numbness, no tremors  MUSCULOSKELETAL: No joint pain, no muscle pain  GENITOURINARY: no dysuria, no frequency, no hesitancy  PSYCHIATRY: no depression , no anxiety  ALL OTHER  ROS negative        Vital Signs Last 24 Hrs  T(C): 36.3 (19 Mar 2021 12:49), Max: 37.3 (18 Mar 2021 14:52)  T(F): 97.3 (19 Mar 2021 12:49), Max: 99.2 (18 Mar 2021 14:52)  HR: 85 (19 Mar 2021 12:49) (77 - 90)  BP: 159/66 (19 Mar 2021 12:49) (128/60 - 159/66)  BP(mean): --  RR: 16 (19 Mar 2021 12:49) (16 - 18)  SpO2: 100% (19 Mar 2021 12:49) (94% - 100%)    ________________________________________________  PHYSICAL EXAM:  GENERAL: NAD  HEENT: Normocephalic;  conjunctivae and sclerae clear; moist mucous membranes;   NECK : supple  CHEST/LUNG: Clear to auscultation bilaterally with good air entry   HEART: S1 S2  regular; no murmurs, gallops or rubs  ABDOMEN: Soft, Nontender, Nondistended; Bowel sounds present  EXTREMITIES: no cyanosis; no edema; no calf tenderness  SKIN: warm and dry; no rash  NERVOUS SYSTEM:  Awake and alert; Oriented  to place, person and time ; no new deficits    _________________________________________________  LABS:                        9.6    9.15  )-----------( 284      ( 19 Mar 2021 08:17 )             29.3     03-19    142  |  111<H>  |  21<H>  ----------------------------<  88  4.9   |  27  |  1.05    Ca    8.7      19 Mar 2021 08:17    TPro  5.1<L>  /  Alb  2.3<L>  /  TBili  x   /  DBili  x   /  AST  x   /  ALT  x   /  AlkPhos  x   03-18        CAPILLARY BLOOD GLUCOSE            RADIOLOGY & ADDITIONAL TESTS:    EXAM:  CT ANGIO CHEST (W)AW IC                            PROCEDURE DATE:  03/17/2021          INTERPRETATION:  CLINICAL INFORMATION: Shortness of breath. COVID positive. Lung mass    COMPARISON: Chest x-ray of 3/15/2021    CONTRAST/COMPLICATIONS:  IV Contrast: Omnipaque 350 / 38 cc administered / 62 cc discarded.  Oral Contrast: NONE  Complications: None reported at time of study completion    PROCEDURE:  CT Angiography of the Chest.  Sagittal and coronal reformats were performed as well as 3D(MIP) reconstructions.    FINDINGS:    LUNGS AND AIRWAYS: Patent central airways.  Lungs are remarkable for a pleural-based mass in the lateral left upper lobe suspicious for lung infarction in the setting of pulmonary embolism..  PLEURA: No pleural effusion.  MEDIASTINUM AND WILD: No lymphadenopathy.  VESSELS: Atherosclerotic aortic calcification. Coronary artery calcification. Bilateral multiple filling defects in pulmonary arteries to the upper and lower lobes involving segmental branches. There is probable nonocclusive thrombus in the right main pulmonary artery as well  HEART: Heart size is normal. No pericardial effusion. There is suggestion of right heart strain with relative dilatation of the right ventricle versus the left.  CHEST WALL AND LOWER NECK: Within normal limits.  VISUALIZED UPPER ABDOMEN: Within normal limits.  BONES: Within normal limits.    IMPRESSION:  Multiple bilateral pulmonary emboli to multiple segmental arteries. There is suggestion of right heart strain.  Pleural-based mass in the lateral left upper lobe may represent lung infarction.  results discussed with Dr. Young at 8:15 PM on 3/17/2021            FAVIOLA BARTON MD; Attending Radiologist  This document has been electronically signed. Mar 17 2021  8:14PM        EXAM:  CT LUMBAR SPINE                            PROCEDURE DATE:  03/16/2021          INTERPRETATION:  INDICATIONS:  Low back pain. Leg weakness.    TECHNIQUE:  Serial axial images were obtained using multislice helical technique. Sagittal and coronal reformatted images were performed.    COMPARISON EXAMINATION: None available at this time.    FINDINGS:    Normal vertebral body height.    ALIGNMENT: Alignment is anatomic. The lumbosacral angle is mildly indistinct.    L1-L2:  Mild loss of posterior disc height. Minimal degenerative retrolisthesis. Mild broad-based annular bulge. Mild canal and bilateral neural foraminal stenosis.    L2-L3:  Mild loss of posterior disc height. Mild broad-based annular bulge with mild canal and bilateral neural foraminal stenosis.    L3-L4:  Moderate disc space narrowing. Minimal degenerative retrolisthesis. Mild broad-based disc bulge. Mild-to-moderate canal stenosis. Mild-to-moderate bilateral neural foraminal stenosis.    L4-L5:  Moderate loss of mostly posterior disc height. Mild broad-based disc bulge. Mild-to-moderate canal stenosis suspected. Mild facet and ligamentous hypertrophy. Moderate bilateral neural foraminal stenosis.    L5-S1:  Vacuum disc phenomenon. Moderate to severe disc space narrowing worse in the posterior disc space. Moderate to severe bilateral facet hypertrophy with bilateral vacuum joint phenomenon. Suspected moderate AP and transverse canal stenosis. Moderate to severe bilateral neural foraminal stenosis likely impinging both exiting L5 nerve roots.    MISCELLANEOUS:  None.    IMPRESSION:    L5-S1 suspected impingement of both exiting L5 nerve roots.    L4-5 moderate bilateral neural foraminal stenosis.    MRI may provide helpful additional information, if clinically indicated.            BERTHA RAMÍREZ MD, Attending Radiologist  This document has been electronically signed. Mar 16 2021  4:20PM      EXAM:  CT BRAIN                            PROCEDURE DATE:  03/16/2021          INTERPRETATION:  CLINICAL INDICATION: Rule out stroke. Covid-19.      TECHNIQUE: Serial axial images were obtained from the skull base to the vertex without the use of intravenous contrast.  Coronal and sagittal reformations were made.    COMPARISON EXAMINATION:?None available at this time.    FINDINGS:    VENTRICLES AND SULCI: Mildly prominent ventricles and sulci unremarkable for patients age.  INTRA-AXIAL: Non-specific white matter decreased attenuation, likely microvascular disease. No intracranial mass, acute hemorrhage, or midline shift is present.  Basal cisterns are patent.  EXTRA-AXIAL: No extra-axial fluid collection is present.  ORBITS: Unremarkable  VISUALIZED SINUSES: No air-fluid levels are identified.  VISUALIZED MASTOIDS: Clear  CALVARIUM: Intact  MISCELLANEOUS:?Bilateral cataract surgery.    IMPRESSION:    Unremarkable head CT, no acute hemorrhage or midline shift. If symptoms persist consider follow up head CT or MRI if no contraindications.            BERTHA RAMÍREZ MD, Attending Radiologist  This document has been electronically signed. Mar 16 2021  3:57PM      EXAM:  US DPLX LWR EXT VEINS LTD LT                            PROCEDURE DATE:  03/15/2021          INTERPRETATION:  CLINICAL INFORMATION: 89-year-old female with COVID-19 infection and elevated D-dimer and left lower extremity pain. Evaluate for left lower extremity deep venous thrombosis.    COMPARISON: None available.    TECHNIQUE: Duplex sonography of the LEFT LOWER extremity veins with color and spectral Doppler, with and without compression.    FINDINGS:    There is normal compressibilityof the left common femoral, femoral and popliteal veins.  Doppler examination shows normal spontaneous and phasic flow.    No calf vein thrombosis is detected.    IMPRESSION:  No evidence of left lower extremity deep venous thrombosis.              BROCK VARGAS MD; Attending Interventional Radiologist  This document has been electronically signed. Mar 15 2021  5:14PM      Imaging  Reviewed:  YES/NO    Consultant(s) Notes Reviewed:   YES/ No      Plan of care was discussed with patient and /or primary care giver; all questions and concerns were addressed

## 2021-03-19 NOTE — PROGRESS NOTE ADULT - ASSESSMENT
Anemia   No signs of overt bleeding   Not a candidate for EGD and colonoscopy with 1) COVID 2) Right hears strain.   Continue AC with monitoring of CBC   Will follow closely   Advanced care planning was discussed with patient and family.  Advanced care planning forms were reviewed and discussed.  Risks, benefits and alternatives of gastroenterologic procedures were discussed in detail and all questions were answered.    I was physically present for the key portions of the evaluation and management (E/M) service provided.  The patient was personally seen and examined at bedside.  I have edited the note as appropriate.   Thank you for your consultation and allowing  me to participate in the care of your patients. If you have further questions please contact me at [phone].     Ramiro Contreras M.D.

## 2021-03-19 NOTE — PROGRESS NOTE ADULT - PROBLEM SELECTOR PLAN 9
- h/o HTN, on atenolol and chlorthalidone 25mg at home  - c/w atenolol  - hold chlorthalidone given JORDI  - monitor BP
h/o HTN, on atenolol and chlorthalidone 25mg at home  c/w atenolol  hold chlorthalidone given JORDI  monitor BP
- h/o HTN, on atenolol and chlorthalidone 25mg at home  - c/w atenolol  - hold chlorthalidone given JORDI  - monitor BP
h/o HTN, on atenolol and chlorthalidone 25mg at home  c/w atenolol  - hold chlorthalidone given JORDI  - monitor BP
h/o HTN, on atenolol and chlorthalidone 25mg at home  c/w atenolol  - hold chlorthalidone given JORDI  - monitor BP

## 2021-03-19 NOTE — PROGRESS NOTE ADULT - PROBLEM SELECTOR PLAN 2
[id number] and switched to Full dose Lovenox  CTA chest with multiple PE bilaterally  pt. on Lovenox 75mg BID  Doppler neg

## 2021-03-19 NOTE — PROGRESS NOTE ADULT - PROBLEM SELECTOR PROBLEM 1
Weakness of right lower extremity

## 2021-03-19 NOTE — PROGRESS NOTE ADULT - SUBJECTIVE AND OBJECTIVE BOX
Summary:   89y  Female      Subjective:   Events noted     Objective:    MEDICATIONS  (STANDING):  ATENolol  Tablet 50 milliGRAM(s) Oral daily  enoxaparin Injectable 75 milliGRAM(s) SubCutaneous two times a day  influenza   Vaccine 0.5 milliLiter(s) IntraMuscular once  lidocaine   Patch 1 Patch Transdermal daily  melatonin 5 milliGRAM(s) Oral at bedtime  pantoprazole    Tablet 40 milliGRAM(s) Oral before breakfast  simvastatin 10 milliGRAM(s) Oral at bedtime  sodium chloride 0.9%. 1000 milliLiter(s) (80 mL/Hr) IV Continuous <Continuous>  zinc oxide 20% Ointment 1 Application(s) Topical daily    MEDICATIONS  (PRN):  acetaminophen   Tablet .. 650 milliGRAM(s) Oral every 6 hours PRN Mild Pain (1 - 3), Moderate Pain (4 - 6), Severe Pain (7 - 10)  collagenase Ointment 1 Application(s) Topical daily PRN Decubitus ulcer              Vital Signs Last 24 Hrs  T(C): 36.3 (19 Mar 2021 12:49), Max: 37.3 (18 Mar 2021 20:39)  T(F): 97.3 (19 Mar 2021 12:49), Max: 99.2 (18 Mar 2021 20:39)  HR: 85 (19 Mar 2021 12:49) (85 - 90)  BP: 159/66 (19 Mar 2021 12:49) (136/52 - 159/66)  BP(mean): --  RR: 16 (19 Mar 2021 12:49) (16 - 17)  SpO2: 100% (19 Mar 2021 12:49) (94% - 100%)      General:  Well developed, well nourished, alert and active, no pallor, NAD.  HEENT:    Normal appearance of conjunctiva, ears, nose, lips, oropharynx, and oral mucosa, anicteric.  Neck:  No masses, no asymmetry.  Lymph Nodes:  No lymphadenopathy.   Cardiovascular:  RRR normal S1/S2, no murmur.  Respiratory:  CTA B/L, normal respiratory effort.   Abdominal:   soft, no masses or tenderness, normoactive BS, NT/ND, no HSM.  Extremities:   No clubbing or cyanosis, normal capillary refill, no edema.   Skin:   No rash, jaundice, lesions, eczema.   Musculoskeletal:  No joint swelling, erythema or tenderness.   Neuro: No focal deficits.   Other:       LABS:                        9.6    9.15  )-----------( 284      ( 19 Mar 2021 08:17 )             29.3     03-19    142  |  111<H>  |  21<H>  ----------------------------<  88  4.9   |  27  |  1.05    Ca    8.7      19 Mar 2021 08:17    TPro  5.1<L>  /  Alb  2.3<L>  /  TBili  x   /  DBili  x   /  AST  x   /  ALT  x   /  AlkPhos  x   03-18          RADIOLOGY & ADDITIONAL TESTS:

## 2021-03-20 LAB
ANA PAT FLD IF-IMP: ABNORMAL
ANA TITR SER: ABNORMAL
ANION GAP SERPL CALC-SCNC: 4 MMOL/L — LOW (ref 5–17)
BUN SERPL-MCNC: 25 MG/DL — HIGH (ref 7–18)
CALCIUM SERPL-MCNC: 9 MG/DL — SIGNIFICANT CHANGE UP (ref 8.4–10.5)
CHLORIDE SERPL-SCNC: 110 MMOL/L — HIGH (ref 96–108)
CO2 SERPL-SCNC: 28 MMOL/L — SIGNIFICANT CHANGE UP (ref 22–31)
CREAT SERPL-MCNC: 1.12 MG/DL — SIGNIFICANT CHANGE UP (ref 0.5–1.3)
GLUCOSE SERPL-MCNC: 101 MG/DL — HIGH (ref 70–99)
HCT VFR BLD CALC: 30.5 % — LOW (ref 34.5–45)
HGB BLD-MCNC: 9.8 G/DL — LOW (ref 11.5–15.5)
MCHC RBC-ENTMCNC: 28.8 PG — SIGNIFICANT CHANGE UP (ref 27–34)
MCHC RBC-ENTMCNC: 32.1 GM/DL — SIGNIFICANT CHANGE UP (ref 32–36)
MCV RBC AUTO: 89.7 FL — SIGNIFICANT CHANGE UP (ref 80–100)
NRBC # BLD: 0 /100 WBCS — SIGNIFICANT CHANGE UP (ref 0–0)
PLATELET # BLD AUTO: 296 K/UL — SIGNIFICANT CHANGE UP (ref 150–400)
POTASSIUM SERPL-MCNC: 4.6 MMOL/L — SIGNIFICANT CHANGE UP (ref 3.5–5.3)
POTASSIUM SERPL-SCNC: 4.6 MMOL/L — SIGNIFICANT CHANGE UP (ref 3.5–5.3)
RBC # BLD: 3.4 M/UL — LOW (ref 3.8–5.2)
RBC # FLD: 15.2 % — HIGH (ref 10.3–14.5)
SODIUM SERPL-SCNC: 142 MMOL/L — SIGNIFICANT CHANGE UP (ref 135–145)
WBC # BLD: 10.44 K/UL — SIGNIFICANT CHANGE UP (ref 3.8–10.5)
WBC # FLD AUTO: 10.44 K/UL — SIGNIFICANT CHANGE UP (ref 3.8–10.5)
ZINC SERPL-MCNC: 98 UG/DL — SIGNIFICANT CHANGE UP (ref 44–115)

## 2021-03-20 PROCEDURE — 99232 SBSQ HOSP IP/OBS MODERATE 35: CPT | Mod: GC

## 2021-03-20 RX ORDER — PREGABALIN 225 MG/1
1000 CAPSULE ORAL DAILY
Refills: 0 | Status: DISCONTINUED | OUTPATIENT
Start: 2021-03-20 | End: 2021-03-22

## 2021-03-20 RX ORDER — APIXABAN 2.5 MG/1
2 TABLET, FILM COATED ORAL
Qty: 0 | Refills: 0 | DISCHARGE
Start: 2021-03-20

## 2021-03-20 RX ORDER — ZINC OXIDE 200 MG/G
1 OINTMENT TOPICAL
Qty: 0 | Refills: 0 | DISCHARGE
Start: 2021-03-20

## 2021-03-20 RX ORDER — CHLORTHALIDONE 50 MG
1 TABLET ORAL
Qty: 0 | Refills: 0 | DISCHARGE

## 2021-03-20 RX ORDER — COLLAGENASE CLOSTRIDIUM HIST. 250 UNIT/G
1 OINTMENT (GRAM) TOPICAL
Qty: 0 | Refills: 0 | DISCHARGE
Start: 2021-03-20

## 2021-03-20 RX ORDER — PREGABALIN 225 MG/1
1 CAPSULE ORAL
Qty: 0 | Refills: 0 | DISCHARGE
Start: 2021-03-20

## 2021-03-20 RX ORDER — APIXABAN 2.5 MG/1
10 TABLET, FILM COATED ORAL EVERY 12 HOURS
Refills: 0 | Status: DISCONTINUED | OUTPATIENT
Start: 2021-03-20 | End: 2021-03-22

## 2021-03-20 RX ORDER — POTASSIUM CHLORIDE 20 MEQ
1 PACKET (EA) ORAL
Qty: 0 | Refills: 0 | DISCHARGE

## 2021-03-20 RX ORDER — LANOLIN ALCOHOL/MO/W.PET/CERES
1 CREAM (GRAM) TOPICAL
Qty: 0 | Refills: 0 | DISCHARGE
Start: 2021-03-20

## 2021-03-20 RX ORDER — LIDOCAINE 4 G/100G
0 CREAM TOPICAL
Qty: 0 | Refills: 0 | DISCHARGE
Start: 2021-03-20

## 2021-03-20 RX ADMIN — LIDOCAINE 1 PATCH: 4 CREAM TOPICAL at 11:15

## 2021-03-20 RX ADMIN — ENOXAPARIN SODIUM 75 MILLIGRAM(S): 100 INJECTION SUBCUTANEOUS at 17:16

## 2021-03-20 RX ADMIN — ZINC OXIDE 1 APPLICATION(S): 200 OINTMENT TOPICAL at 11:15

## 2021-03-20 RX ADMIN — Medication 5 MILLIGRAM(S): at 22:09

## 2021-03-20 RX ADMIN — LIDOCAINE 1 PATCH: 4 CREAM TOPICAL at 23:59

## 2021-03-20 RX ADMIN — LIDOCAINE 1 PATCH: 4 CREAM TOPICAL at 19:45

## 2021-03-20 RX ADMIN — PANTOPRAZOLE SODIUM 40 MILLIGRAM(S): 20 TABLET, DELAYED RELEASE ORAL at 06:02

## 2021-03-20 RX ADMIN — ATENOLOL 50 MILLIGRAM(S): 25 TABLET ORAL at 05:16

## 2021-03-20 RX ADMIN — Medication 1 APPLICATION(S): at 11:26

## 2021-03-20 RX ADMIN — SIMVASTATIN 10 MILLIGRAM(S): 20 TABLET, FILM COATED ORAL at 22:09

## 2021-03-20 RX ADMIN — ENOXAPARIN SODIUM 75 MILLIGRAM(S): 100 INJECTION SUBCUTANEOUS at 05:16

## 2021-03-20 NOTE — PROGRESS NOTE ADULT - ASSESSMENT
BL PE   COVID 19 infection  BL LE weakness concern for GBS vs CIDP vs COVID associated Neuropathy- improving   BL L5 Nerve root impingement   Normocytic anemia  HTN  HLD  JORDI resolved     Plan:  Start on Eliquis  TTE noted, RV systolic function normal  Cont Atenolol with holding parameter  Discussed with Dr Hudson, LP can be deferred for now, would consider if patient had progressive worsening of symptoms. Will follow official recommendations  Hb stable, cont to monitor  GI consult appreciated  PPI  Will monitor NIF daily  GOC discussed full code  Pending placement to [facility] BL PE; ruled out right heart strain  COVID 19 infection  BL LE weakness concern for GBS vs CIDP vs COVID associated Neuropathy- improving   BL L5 Nerve root impingement   Normocytic anemia  HTN  HLD  JORDI resolved     Plan:  Start on Eliquis  TTE noted, RV systolic function normal  Cont Atenolol with holding parameter  As per Dr Hudson, LP can be deferred for now, would consider if patient had progressive worsening of symptoms  Hb stable, cont to monitor  GI consult appreciated  PPI  Will monitor NIF daily  Repeat PT eval  GOC discussed full code  Pending placement to [facility] BL PE; ruled out right heart strain  COVID 19 infection  BL LE weakness concern for GBS vs CIDP vs COVID associated Neuropathy- improving   BL L5 Nerve root impingement   OA of right knee  Normocytic anemia  HTN  HLD  JORDI resolved     Plan:  Start on Eliquis  TTE noted, RV systolic function normal  Cont Atenolol with holding parameter  As per Dr Hudson, LP can be deferred for now, would consider if patient had progressive worsening of symptoms  Hb stable, cont to monitor  Peripheral neuropathy work up nonconclusive   GI consult appreciated  PPI  Will monitor NIF daily  Repeat PT eval  GOC discussed full code  Pending placement to [facility]

## 2021-03-20 NOTE — PROGRESS NOTE ADULT - SUBJECTIVE AND OBJECTIVE BOX
Patient is a 89y old  Female who presents with a chief complaint of r/o stroke (19 Mar 2021 16:07)        INTERVAL HPI/OVERNIGHT EVENTS:  T(C): 37.6 (03-20-21 @ 14:15), Max: 37.6 (03-20-21 @ 14:15)  HR: 79 (03-20-21 @ 14:15) (74 - 89)  BP: 137/61 (03-20-21 @ 14:15) (135/72 - 155/77)  RR: 16 (03-20-21 @ 14:15) (16 - 16)  SpO2: 100% (03-20-21 @ 14:15) (99% - 100%)  Wt(kg): --  I&O's Summary      REVIEW OF SYSTEMS: denies fever, chills, SOB, palpitations, chest pain, abdominal pain, nausea, vomitting, diarrhea, constipation, dizziness    MEDICATIONS  (STANDING):  ATENolol  Tablet 50 milliGRAM(s) Oral daily  enoxaparin Injectable 75 milliGRAM(s) SubCutaneous two times a day  influenza   Vaccine 0.5 milliLiter(s) IntraMuscular once  lidocaine   Patch 1 Patch Transdermal daily  melatonin 5 milliGRAM(s) Oral at bedtime  pantoprazole    Tablet 40 milliGRAM(s) Oral before breakfast  simvastatin 10 milliGRAM(s) Oral at bedtime  sodium chloride 0.9%. 1000 milliLiter(s) (80 mL/Hr) IV Continuous <Continuous>  zinc oxide 20% Ointment 1 Application(s) Topical daily    MEDICATIONS  (PRN):  acetaminophen   Tablet .. 650 milliGRAM(s) Oral every 6 hours PRN Mild Pain (1 - 3), Moderate Pain (4 - 6), Severe Pain (7 - 10)  collagenase Ointment 1 Application(s) Topical daily PRN Decubitus ulcer      PHYSICAL EXAM:  GENERAL: NAD, well-groomed, well-developed  HEAD:  Atraumatic, Normocephalic  EYES: EOMI, PERRLA, conjunctiva and sclera clear  ENMT: No tonsillar erythema, exudates, or enlargement; Moist mucous membranes, Good dentition, No lesions  NECK: Supple, No JVD, Normal thyroid  NERVOUS SYSTEM:  Alert & Oriented X3, Good concentration; Motor Strength 5/5 B/L upper and lower extremities; DTRs 2+ intact and symmetric  CHEST/LUNG: Clear to percussion bilaterally; No rales, rhonchi, wheezing, or rubs  HEART: Regular rate and rhythm; No murmurs, rubs, or gallops  ABDOMEN: Soft, Nontender, Nondistended; Bowel sounds present  EXTREMITIES:  2+ Peripheral Pulses, No clubbing, cyanosis, or edema  LYMPH: No lymphadenopathy noted  SKIN: No rashes or lesions  LABS:                        9.8    10.44 )-----------( 296      ( 20 Mar 2021 05:44 )             30.5     03-20    142  |  110<H>  |  25<H>  ----------------------------<  101<H>  4.6   |  28  |  1.12    Ca    9.0      20 Mar 2021 05:44          CAPILLARY BLOOD GLUCOSE             Patient is a 89y old  Female who presents with a chief complaint of r/o stroke (19 Mar 2021 16:07)    Patient was seen and examined at bedside   Denies any complains     INTERVAL HPI/OVERNIGHT EVENTS:  T(C): 37.6 (03-20-21 @ 14:15), Max: 37.6 (03-20-21 @ 14:15)  HR: 79 (03-20-21 @ 14:15) (74 - 89)  BP: 137/61 (03-20-21 @ 14:15) (135/72 - 155/77)  RR: 16 (03-20-21 @ 14:15) (16 - 16)  SpO2: 100% (03-20-21 @ 14:15) (99% - 100%)  Wt(kg): --  I&O's Summary      REVIEW OF SYSTEMS: denies fever, chills, SOB, palpitations, chest pain, abdominal pain, nausea, vomiting, diarrhea, constipation, dizziness    MEDICATIONS  (STANDING):  ATENolol  Tablet 50 milliGRAM(s) Oral daily  enoxaparin Injectable 75 milliGRAM(s) SubCutaneous two times a day  influenza   Vaccine 0.5 milliLiter(s) IntraMuscular once  lidocaine   Patch 1 Patch Transdermal daily  melatonin 5 milliGRAM(s) Oral at bedtime  pantoprazole    Tablet 40 milliGRAM(s) Oral before breakfast  simvastatin 10 milliGRAM(s) Oral at bedtime  sodium chloride 0.9%. 1000 milliLiter(s) (80 mL/Hr) IV Continuous <Continuous>  zinc oxide 20% Ointment 1 Application(s) Topical daily    MEDICATIONS  (PRN):  acetaminophen   Tablet .. 650 milliGRAM(s) Oral every 6 hours PRN Mild Pain (1 - 3), Moderate Pain (4 - 6), Severe Pain (7 - 10)  collagenase Ointment 1 Application(s) Topical daily PRN Decubitus ulcer      PHYSICAL EXAM:  GENERAL: NAD, well-groomed, well-developed  NERVOUS SYSTEM:  Alert & Oriented X3, Good concentration; Motor Strength 3/5 B/L lower extremities, more strength noticed on plantar and dorsiflexion  CHEST/LUNG: Clear to auscultation bilaterally; No rales, rhonchi, wheezing, or rubs  HEART: Regular rate and rhythm; No murmurs, rubs, or gallops  ABDOMEN: Soft, Nontender, Nondistended; Bowel sounds present  EXTREMITIES:  2+ Peripheral Pulses, No clubbing, cyanosis, or edema  SKIN: No rashes or lesions    LABS:                        9.8    10.44 )-----------( 296      ( 20 Mar 2021 05:44 )             30.5     03-20    142  |  110<H>  |  25<H>  ----------------------------<  101<H>  4.6   |  28  |  1.12    Ca    9.0      20 Mar 2021 05:44          CAPILLARY BLOOD GLUCOSE

## 2021-03-21 LAB
ANION GAP SERPL CALC-SCNC: 5 MMOL/L — SIGNIFICANT CHANGE UP (ref 5–17)
BUN SERPL-MCNC: 30 MG/DL — HIGH (ref 7–18)
CALCIUM SERPL-MCNC: 8.9 MG/DL — SIGNIFICANT CHANGE UP (ref 8.4–10.5)
CHLORIDE SERPL-SCNC: 109 MMOL/L — HIGH (ref 96–108)
CO2 SERPL-SCNC: 28 MMOL/L — SIGNIFICANT CHANGE UP (ref 22–31)
CREAT SERPL-MCNC: 1.06 MG/DL — SIGNIFICANT CHANGE UP (ref 0.5–1.3)
GLUCOSE SERPL-MCNC: 90 MG/DL — SIGNIFICANT CHANGE UP (ref 70–99)
HCT VFR BLD CALC: 27.3 % — LOW (ref 34.5–45)
HCT VFR BLD CALC: 29.7 % — LOW (ref 34.5–45)
HGB BLD-MCNC: 8.8 G/DL — LOW (ref 11.5–15.5)
HGB BLD-MCNC: 9.6 G/DL — LOW (ref 11.5–15.5)
MCHC RBC-ENTMCNC: 29.1 PG — SIGNIFICANT CHANGE UP (ref 27–34)
MCHC RBC-ENTMCNC: 29.1 PG — SIGNIFICANT CHANGE UP (ref 27–34)
MCHC RBC-ENTMCNC: 32.2 GM/DL — SIGNIFICANT CHANGE UP (ref 32–36)
MCHC RBC-ENTMCNC: 32.3 GM/DL — SIGNIFICANT CHANGE UP (ref 32–36)
MCV RBC AUTO: 90 FL — SIGNIFICANT CHANGE UP (ref 80–100)
MCV RBC AUTO: 90.4 FL — SIGNIFICANT CHANGE UP (ref 80–100)
NRBC # BLD: 0 /100 WBCS — SIGNIFICANT CHANGE UP (ref 0–0)
NRBC # BLD: 0 /100 WBCS — SIGNIFICANT CHANGE UP (ref 0–0)
OB PNL STL: NEGATIVE — SIGNIFICANT CHANGE UP
PLATELET # BLD AUTO: 306 K/UL — SIGNIFICANT CHANGE UP (ref 150–400)
PLATELET # BLD AUTO: 344 K/UL — SIGNIFICANT CHANGE UP (ref 150–400)
POTASSIUM SERPL-MCNC: 4.4 MMOL/L — SIGNIFICANT CHANGE UP (ref 3.5–5.3)
POTASSIUM SERPL-SCNC: 4.4 MMOL/L — SIGNIFICANT CHANGE UP (ref 3.5–5.3)
RBC # BLD: 3.02 M/UL — LOW (ref 3.8–5.2)
RBC # BLD: 3.3 M/UL — LOW (ref 3.8–5.2)
RBC # FLD: 15.7 % — HIGH (ref 10.3–14.5)
RBC # FLD: 15.9 % — HIGH (ref 10.3–14.5)
SODIUM SERPL-SCNC: 142 MMOL/L — SIGNIFICANT CHANGE UP (ref 135–145)
WBC # BLD: 10.04 K/UL — SIGNIFICANT CHANGE UP (ref 3.8–10.5)
WBC # BLD: 9.94 K/UL — SIGNIFICANT CHANGE UP (ref 3.8–10.5)
WBC # FLD AUTO: 10.04 K/UL — SIGNIFICANT CHANGE UP (ref 3.8–10.5)
WBC # FLD AUTO: 9.94 K/UL — SIGNIFICANT CHANGE UP (ref 3.8–10.5)

## 2021-03-21 PROCEDURE — 99232 SBSQ HOSP IP/OBS MODERATE 35: CPT | Mod: GC

## 2021-03-21 RX ORDER — IRON SUCROSE 20 MG/ML
100 INJECTION, SOLUTION INTRAVENOUS EVERY 24 HOURS
Refills: 0 | Status: DISCONTINUED | OUTPATIENT
Start: 2021-03-21 | End: 2021-03-21

## 2021-03-21 RX ADMIN — Medication 1 APPLICATION(S): at 06:35

## 2021-03-21 RX ADMIN — Medication 5 MILLIGRAM(S): at 21:13

## 2021-03-21 RX ADMIN — APIXABAN 10 MILLIGRAM(S): 2.5 TABLET, FILM COATED ORAL at 05:27

## 2021-03-21 RX ADMIN — Medication 650 MILLIGRAM(S): at 00:00

## 2021-03-21 RX ADMIN — ATENOLOL 50 MILLIGRAM(S): 25 TABLET ORAL at 05:27

## 2021-03-21 RX ADMIN — LIDOCAINE 1 PATCH: 4 CREAM TOPICAL at 11:24

## 2021-03-21 RX ADMIN — LIDOCAINE 1 PATCH: 4 CREAM TOPICAL at 23:25

## 2021-03-21 RX ADMIN — SIMVASTATIN 10 MILLIGRAM(S): 20 TABLET, FILM COATED ORAL at 21:13

## 2021-03-21 RX ADMIN — LIDOCAINE 1 PATCH: 4 CREAM TOPICAL at 19:47

## 2021-03-21 RX ADMIN — APIXABAN 10 MILLIGRAM(S): 2.5 TABLET, FILM COATED ORAL at 17:07

## 2021-03-21 RX ADMIN — Medication 650 MILLIGRAM(S): at 00:30

## 2021-03-21 RX ADMIN — ZINC OXIDE 1 APPLICATION(S): 200 OINTMENT TOPICAL at 11:27

## 2021-03-21 RX ADMIN — PREGABALIN 1000 MICROGRAM(S): 225 CAPSULE ORAL at 11:27

## 2021-03-21 RX ADMIN — PANTOPRAZOLE SODIUM 40 MILLIGRAM(S): 20 TABLET, DELAYED RELEASE ORAL at 05:27

## 2021-03-21 NOTE — PROGRESS NOTE ADULT - ASSESSMENT
Anemia   No signs of overt bleeding   Not a candidate for EGD and colonoscopy with 1) COVID 2) Right hears strain.   Continue AC with monitoring of CBC   Will follow closely   Advanced care planning was discussed with patient and family.  Advanced care planning forms were reviewed and discussed.  Risks, benefits and alternatives of gastroenterologic procedures were discussed in detail and all questions were answered.    I was physically present for the key portions of the evaluation and management (E/M) service provided.  The patient was personally seen and examined at bedside.  I have edited the note as appropriate.   Thank you for your consultation and allowing  me to participate in the care of your patients. If you have further questions please contact me at [phone].     Ramiro Contreras M.D.

## 2021-03-21 NOTE — PROGRESS NOTE ADULT - SUBJECTIVE AND OBJECTIVE BOX
Patient is a 89y old  Female who presents with a chief complaint of r/o stroke (21 Mar 2021 12:39)    Patient was seen and examined at bedside   Denies any complains     INTERVAL HPI/OVERNIGHT EVENTS:  T(C): 36.7 (03-21-21 @ 12:11), Max: 37.6 (03-20-21 @ 14:15)  HR: 76 (03-21-21 @ 12:11) (73 - 84)  BP: 129/56 (03-21-21 @ 12:11) (129/56 - 137/61)  RR: 16 (03-21-21 @ 12:11) (16 - 16)  SpO2: 100% (03-21-21 @ 12:11) (98% - 100%)  Wt(kg): --  I&O's Summary      REVIEW OF SYSTEMS: denies fever, chills, SOB, palpitations, chest pain, abdominal pain, nausea, vomiting, diarrhea, constipation, dizziness    MEDICATIONS  (STANDING):  apixaban 10 milliGRAM(s) Oral every 12 hours  ATENolol  Tablet 50 milliGRAM(s) Oral daily  cyanocobalamin 1000 MICROGram(s) Oral daily  influenza   Vaccine 0.5 milliLiter(s) IntraMuscular once  lidocaine   Patch 1 Patch Transdermal daily  melatonin 5 milliGRAM(s) Oral at bedtime  pantoprazole    Tablet 40 milliGRAM(s) Oral before breakfast  simvastatin 10 milliGRAM(s) Oral at bedtime  sodium chloride 0.9%. 1000 milliLiter(s) (80 mL/Hr) IV Continuous <Continuous>  zinc oxide 20% Ointment 1 Application(s) Topical daily    MEDICATIONS  (PRN):  acetaminophen   Tablet .. 650 milliGRAM(s) Oral every 6 hours PRN Mild Pain (1 - 3), Moderate Pain (4 - 6), Severe Pain (7 - 10)  collagenase Ointment 1 Application(s) Topical daily PRN Decubitus ulcer      PHYSICAL EXAM:  GENERAL: NAD  HEAD:  Atraumatic, Normocephalic  NERVOUS SYSTEM:  Alert & Oriented X3, Good concentration; Motor Strength 3/5 lower extremities, same as yesterday  CHEST/LUNG: Clear to auscultation bilaterally; No rales, rhonchi, wheezing, or rubs  HEART: Regular rate and rhythm; No murmurs, rubs, or gallops  ABDOMEN: Soft, Nontender, Nondistended; Bowel sounds present  EXTREMITIES:  2+ Peripheral Pulses, No clubbing, cyanosis, or edema  SKIN: No rashes or lesions    LABS:                        9.6    9.94  )-----------( 344      ( 21 Mar 2021 12:21 )             29.7     03-21    142  |  109<H>  |  30<H>  ----------------------------<  90  4.4   |  28  |  1.06    Ca    8.9      21 Mar 2021 05:59          CAPILLARY BLOOD GLUCOSE

## 2021-03-21 NOTE — PROGRESS NOTE ADULT - ASSESSMENT
BL PE; ruled out right heart strain  COVID 19 infection  BL LE weakness concern for GBS vs CIDP vs COVID associated Neuropathy- improving   BL L5 Nerve root impingement   OA of right knee  Normocytic anemia  HTN  HLD  JORDI resolved     Plan:  Cont on Eliquis  Hb dropped this am, repeat CBC Hb stable  Obtain Iron profile, supplement if deficient   Cont Atenolol with holding parameter  Will monitor for LE weakness  Peripheral neuropathy work up nonconclusive, follow up with neurology as outpatient  Daily B12 supplementation  GI consult appreciated  PPI  Monitor NIF daily  Repeat PT eval  GOC discussed full code  Pending placement to [facility]

## 2021-03-22 ENCOUNTER — TRANSCRIPTION ENCOUNTER (OUTPATIENT)
Age: 86
End: 2021-03-22

## 2021-03-22 VITALS
DIASTOLIC BLOOD PRESSURE: 62 MMHG | OXYGEN SATURATION: 99 % | SYSTOLIC BLOOD PRESSURE: 137 MMHG | HEART RATE: 77 BPM | RESPIRATION RATE: 18 BRPM | TEMPERATURE: 98 F

## 2021-03-22 LAB
ACRM MODULATING ANTIBODY: <0.03 NMOL/L — SIGNIFICANT CHANGE UP (ref 0–0.24)
ANION GAP SERPL CALC-SCNC: 6 MMOL/L — SIGNIFICANT CHANGE UP (ref 5–17)
BUN SERPL-MCNC: 28 MG/DL — HIGH (ref 7–18)
CALCIUM SERPL-MCNC: 8.8 MG/DL — SIGNIFICANT CHANGE UP (ref 8.4–10.5)
CHLORIDE SERPL-SCNC: 109 MMOL/L — HIGH (ref 96–108)
CO2 SERPL-SCNC: 26 MMOL/L — SIGNIFICANT CHANGE UP (ref 22–31)
CREAT SERPL-MCNC: 1.18 MG/DL — SIGNIFICANT CHANGE UP (ref 0.5–1.3)
FERRITIN SERPL-MCNC: 786 NG/ML — HIGH (ref 15–150)
GLUCOSE SERPL-MCNC: 131 MG/DL — HIGH (ref 70–99)
HCT VFR BLD CALC: 29.3 % — LOW (ref 34.5–45)
HGB BLD-MCNC: 9.2 G/DL — LOW (ref 11.5–15.5)
IRON SATN MFR SERPL: 19 % — SIGNIFICANT CHANGE UP (ref 15–50)
IRON SATN MFR SERPL: 57 UG/DL — SIGNIFICANT CHANGE UP (ref 40–150)
MCHC RBC-ENTMCNC: 29.2 PG — SIGNIFICANT CHANGE UP (ref 27–34)
MCHC RBC-ENTMCNC: 31.4 GM/DL — LOW (ref 32–36)
MCV RBC AUTO: 93 FL — SIGNIFICANT CHANGE UP (ref 80–100)
NRBC # BLD: 0 /100 WBCS — SIGNIFICANT CHANGE UP (ref 0–0)
PLATELET # BLD AUTO: 333 K/UL — SIGNIFICANT CHANGE UP (ref 150–400)
POTASSIUM SERPL-MCNC: 4.6 MMOL/L — SIGNIFICANT CHANGE UP (ref 3.5–5.3)
POTASSIUM SERPL-SCNC: 4.6 MMOL/L — SIGNIFICANT CHANGE UP (ref 3.5–5.3)
RBC # BLD: 3.15 M/UL — LOW (ref 3.8–5.2)
RBC # FLD: 16.3 % — HIGH (ref 10.3–14.5)
SODIUM SERPL-SCNC: 141 MMOL/L — SIGNIFICANT CHANGE UP (ref 135–145)
TIBC SERPL-MCNC: 294 UG/DL — SIGNIFICANT CHANGE UP (ref 250–450)
TRANSFERRIN SERPL-MCNC: 169 MG/DL — LOW (ref 200–360)
UIBC SERPL-MCNC: 237 UG/DL — SIGNIFICANT CHANGE UP (ref 110–370)
WBC # BLD: 11.03 K/UL — HIGH (ref 3.8–10.5)
WBC # FLD AUTO: 11.03 K/UL — HIGH (ref 3.8–10.5)

## 2021-03-22 PROCEDURE — 71275 CT ANGIOGRAPHY CHEST: CPT

## 2021-03-22 PROCEDURE — 86038 ANTINUCLEAR ANTIBODIES: CPT

## 2021-03-22 PROCEDURE — 83735 ASSAY OF MAGNESIUM: CPT

## 2021-03-22 PROCEDURE — 85025 COMPLETE CBC W/AUTO DIFF WBC: CPT

## 2021-03-22 PROCEDURE — 82728 ASSAY OF FERRITIN: CPT

## 2021-03-22 PROCEDURE — 84466 ASSAY OF TRANSFERRIN: CPT

## 2021-03-22 PROCEDURE — 84207 ASSAY OF VITAMIN B-6: CPT

## 2021-03-22 PROCEDURE — 85379 FIBRIN DEGRADATION QUANT: CPT

## 2021-03-22 PROCEDURE — 85610 PROTHROMBIN TIME: CPT

## 2021-03-22 PROCEDURE — 83921 ORGANIC ACID SINGLE QUANT: CPT

## 2021-03-22 PROCEDURE — 83550 IRON BINDING TEST: CPT

## 2021-03-22 PROCEDURE — 80061 LIPID PANEL: CPT

## 2021-03-22 PROCEDURE — 84481 FREE ASSAY (FT-3): CPT

## 2021-03-22 PROCEDURE — 85652 RBC SED RATE AUTOMATED: CPT

## 2021-03-22 PROCEDURE — 84156 ASSAY OF PROTEIN URINE: CPT

## 2021-03-22 PROCEDURE — 93306 TTE W/DOPPLER COMPLETE: CPT

## 2021-03-22 PROCEDURE — 83090 ASSAY OF HOMOCYSTEINE: CPT

## 2021-03-22 PROCEDURE — 82962 GLUCOSE BLOOD TEST: CPT

## 2021-03-22 PROCEDURE — 86769 SARS-COV-2 COVID-19 ANTIBODY: CPT

## 2021-03-22 PROCEDURE — 82746 ASSAY OF FOLIC ACID SERUM: CPT

## 2021-03-22 PROCEDURE — 85027 COMPLETE CBC AUTOMATED: CPT

## 2021-03-22 PROCEDURE — 84165 PROTEIN E-PHORESIS SERUM: CPT

## 2021-03-22 PROCEDURE — 99239 HOSP IP/OBS DSCHRG MGMT >30: CPT | Mod: GC

## 2021-03-22 PROCEDURE — 87635 SARS-COV-2 COVID-19 AMP PRB: CPT

## 2021-03-22 PROCEDURE — 96374 THER/PROPH/DIAG INJ IV PUSH: CPT

## 2021-03-22 PROCEDURE — 83540 ASSAY OF IRON: CPT

## 2021-03-22 PROCEDURE — 99232 SBSQ HOSP IP/OBS MODERATE 35: CPT

## 2021-03-22 PROCEDURE — 80053 COMPREHEN METABOLIC PANEL: CPT

## 2021-03-22 PROCEDURE — 86140 C-REACTIVE PROTEIN: CPT

## 2021-03-22 PROCEDURE — 90686 IIV4 VACC NO PRSV 0.5 ML IM: CPT

## 2021-03-22 PROCEDURE — 72131 CT LUMBAR SPINE W/O DYE: CPT

## 2021-03-22 PROCEDURE — 80048 BASIC METABOLIC PNL TOTAL CA: CPT

## 2021-03-22 PROCEDURE — 84100 ASSAY OF PHOSPHORUS: CPT

## 2021-03-22 PROCEDURE — 99285 EMERGENCY DEPT VISIT HI MDM: CPT | Mod: 25

## 2021-03-22 PROCEDURE — U0005: CPT

## 2021-03-22 PROCEDURE — 82570 ASSAY OF URINE CREATININE: CPT

## 2021-03-22 PROCEDURE — 97110 THERAPEUTIC EXERCISES: CPT

## 2021-03-22 PROCEDURE — 82272 OCCULT BLD FECES 1-3 TESTS: CPT

## 2021-03-22 PROCEDURE — 82525 ASSAY OF COPPER: CPT

## 2021-03-22 PROCEDURE — 84133 ASSAY OF URINE POTASSIUM: CPT

## 2021-03-22 PROCEDURE — 82607 VITAMIN B-12: CPT

## 2021-03-22 PROCEDURE — 86041 ACETYLCHOLN RCPTR BNDNG ANTB: CPT

## 2021-03-22 PROCEDURE — 93971 EXTREMITY STUDY: CPT

## 2021-03-22 PROCEDURE — 97530 THERAPEUTIC ACTIVITIES: CPT

## 2021-03-22 PROCEDURE — 84155 ASSAY OF PROTEIN SERUM: CPT

## 2021-03-22 PROCEDURE — 86780 TREPONEMA PALLIDUM: CPT

## 2021-03-22 PROCEDURE — 84300 ASSAY OF URINE SODIUM: CPT

## 2021-03-22 PROCEDURE — 84425 ASSAY OF VITAMIN B-1: CPT

## 2021-03-22 PROCEDURE — 84630 ASSAY OF ZINC: CPT

## 2021-03-22 PROCEDURE — 84443 ASSAY THYROID STIM HORMONE: CPT

## 2021-03-22 PROCEDURE — 83935 ASSAY OF URINE OSMOLALITY: CPT

## 2021-03-22 PROCEDURE — 81001 URINALYSIS AUTO W/SCOPE: CPT

## 2021-03-22 PROCEDURE — 85730 THROMBOPLASTIN TIME PARTIAL: CPT

## 2021-03-22 PROCEDURE — 87641 MR-STAPH DNA AMP PROBE: CPT

## 2021-03-22 PROCEDURE — 82550 ASSAY OF CK (CPK): CPT

## 2021-03-22 PROCEDURE — 87640 STAPH A DNA AMP PROBE: CPT

## 2021-03-22 PROCEDURE — 93005 ELECTROCARDIOGRAM TRACING: CPT

## 2021-03-22 PROCEDURE — 71045 X-RAY EXAM CHEST 1 VIEW: CPT

## 2021-03-22 PROCEDURE — 86334 IMMUNOFIX E-PHORESIS SERUM: CPT

## 2021-03-22 PROCEDURE — 70450 CT HEAD/BRAIN W/O DYE: CPT

## 2021-03-22 PROCEDURE — 82436 ASSAY OF URINE CHLORIDE: CPT

## 2021-03-22 PROCEDURE — 86431 RHEUMATOID FACTOR QUANT: CPT

## 2021-03-22 PROCEDURE — 84439 ASSAY OF FREE THYROXINE: CPT

## 2021-03-22 PROCEDURE — 36415 COLL VENOUS BLD VENIPUNCTURE: CPT

## 2021-03-22 RX ORDER — FOLIC ACID 0.8 MG
1 TABLET ORAL DAILY
Refills: 0 | Status: DISCONTINUED | OUTPATIENT
Start: 2021-03-22 | End: 2021-03-22

## 2021-03-22 RX ORDER — FOLIC ACID 0.8 MG
1 TABLET ORAL
Qty: 0 | Refills: 0 | DISCHARGE
Start: 2021-03-22

## 2021-03-22 RX ORDER — PREGABALIN 225 MG/1
1000 CAPSULE ORAL
Refills: 0 | Status: DISCONTINUED | OUTPATIENT
Start: 2021-03-22 | End: 2021-03-22

## 2021-03-22 RX ADMIN — PREGABALIN 1000 MICROGRAM(S): 225 CAPSULE ORAL at 11:52

## 2021-03-22 RX ADMIN — INFLUENZA VIRUS VACCINE 0.5 MILLILITER(S): 15; 15; 15; 15 SUSPENSION INTRAMUSCULAR at 16:51

## 2021-03-22 RX ADMIN — ATENOLOL 50 MILLIGRAM(S): 25 TABLET ORAL at 05:31

## 2021-03-22 RX ADMIN — PANTOPRAZOLE SODIUM 40 MILLIGRAM(S): 20 TABLET, DELAYED RELEASE ORAL at 05:31

## 2021-03-22 RX ADMIN — APIXABAN 10 MILLIGRAM(S): 2.5 TABLET, FILM COATED ORAL at 05:31

## 2021-03-22 RX ADMIN — Medication 1 MILLIGRAM(S): at 16:54

## 2021-03-22 RX ADMIN — APIXABAN 10 MILLIGRAM(S): 2.5 TABLET, FILM COATED ORAL at 16:48

## 2021-03-22 RX ADMIN — PREGABALIN 1000 MICROGRAM(S): 225 CAPSULE ORAL at 15:29

## 2021-03-22 RX ADMIN — ZINC OXIDE 1 APPLICATION(S): 200 OINTMENT TOPICAL at 11:52

## 2021-03-22 RX ADMIN — LIDOCAINE 1 PATCH: 4 CREAM TOPICAL at 13:39

## 2021-03-22 NOTE — PROGRESS NOTE ADULT - REASON FOR ADMISSION
r/o stroke

## 2021-03-22 NOTE — PROGRESS NOTE ADULT - SUBJECTIVE AND OBJECTIVE BOX
Refer to the Neurology Progress Note of 3/19/21 for current status of neurologic assessment and work-up.      Pt was started on cyanocobalamin 1000mcg PO daily beginning 3/20/21.  If she has, for example, intrinsic factor blocking abs, or achlorhydria (common in the elderly, particularly when taking proton pump inhibitors) then enteral absorption may be inadequate.   In addition, this Pt may be folate deficient, so B12 supplementation alone would not be sufficient.

## 2021-03-22 NOTE — DISCHARGE NOTE NURSING/CASE MANAGEMENT/SOCIAL WORK - NSTOBACCONEVERSMOKERY/N_GEN_A
[FreeTextEntry1] : Pt is a 73 y/o F who presents today for f/u.  Pt has history HTN, mild AS, LVOT obstruction.  She tells me that she has been feeling well and has no complaints.  She denies CP, SOB, diaphoresis, palpitations, dizziness, syncope, LE edema, PND, orthopnea. \par \par TTE 9/2018 showed normal LV function with mild AS, LVOT obstruction\par Nuclear stress test 9/2018 normal myocardial perfusion\par \par PMH: HLD, preDM\par Smoking status: never\par Current exercise:  - no dedicated exercise time\par Daily water intake: not much \par Daily caffeine intake: 2 cups tea\par OTC medications: none\par Family hx: mother "enlarged heart"and "valve stenosis", father CVA at age 62, brother "enlarged heart and valve replacement" at age 58\par Previous cardiac testing: none\par Previous hospitalizations: hysterectomy 1989\par LMP 1989\par 2 children - no problems with pregnancies No

## 2021-03-22 NOTE — PROGRESS NOTE ADULT - PROVIDER SPECIALTY LIST ADULT
Internal Medicine
Neurology
Internal Medicine
Gastroenterology
Gastroenterology
Internal Medicine
Neurology
Internal Medicine

## 2021-03-22 NOTE — DISCHARGE NOTE NURSING/CASE MANAGEMENT/SOCIAL WORK - PATIENT PORTAL LINK FT
You can access the FollowMyHealth Patient Portal offered by [facility] by registering at the following website: [email or web address]. By joining [facility]’s FollowMyHealth portal, you will also be able to view your health information using other applications (apps) compatible with our system.

## 2021-03-22 NOTE — PROGRESS NOTE ADULT - ASSESSMENT
Administer cyanocobalamin 1000mcg IM, folic acid 5mg PO, B complex tab today.      Afterwards, cyanocobalamin 1000mcg IM weekly x 3, folic acid 2mg PO daily, B complex daily.      See the 3/19/21 Neurology Progress Note for additional recommended tests.      In out-Pt follow-up, need to repeat MMA and Hcy to ascertain if there was a response to the vitamin supplement treatment regimen.  Then manage as appropriate.

## 2021-03-22 NOTE — PROGRESS NOTE ADULT - ASSESSMENT
BL PE; ruled out right heart strain  COVID 19 infection  BL LE weakness concern for GBS vs CIDP vs COVID associated Neuropathy- improving   BL L5 Nerve root impingement   OA of right knee  Normocytic anemia  HTN  HLD  OJRDI resolved     Plan:   Tylenol and lidocaine patch for knee pain  Repeat PT eval  Cont on Eliquis  Monitor Hb  Obtain Iron profile, supplement if deficient   Cont Atenolol with holding parameter  Will monitor for LE weakness  Peripheral neuropathy work up nonconclusive, follow up with neurology as outpatient- discussed with Dr Hudson  Will benefit from Vitamin B12 IM weekly for 3 weeks   Cont daily B12 supplementation, obtain Vitamin D level  GI consult appreciated  PPI  Repeat PT eval  GOC discussed full code  Pending placement to [facility]  Discussed the above treatment plan with patient's son. Discussed about risks of bleeding and to avoid fall since patient is on AC. Answered all questions regarding discharge plan.

## 2021-03-22 NOTE — PROGRESS NOTE ADULT - SUBJECTIVE AND OBJECTIVE BOX
Patient is a 89y old  Female who presents with a chief complaint of r/o stroke (21 Mar 2021 13:19)    Patient was seen and examined at bedside   Reports was unable to participate in PT due to right knee pain    INTERVAL HPI/OVERNIGHT EVENTS:  T(C): 36.9 (03-22-21 @ 14:05), Max: 37.1 (03-21-21 @ 21:27)  HR: 77 (03-22-21 @ 14:05) (72 - 84)  BP: 137/62 (03-22-21 @ 14:05) (124/48 - 137/62)  RR: 18 (03-22-21 @ 14:05) (17 - 18)  SpO2: 99% (03-22-21 @ 14:05) (99% - 100%)  Wt(kg): --  I&O's Summary    21 Mar 2021 07:01  -  22 Mar 2021 07:00  --------------------------------------------------------  IN: 0 mL / OUT: 2 mL / NET: -2 mL        REVIEW OF SYSTEMS: denies fever, chills, SOB, palpitations, chest pain, abdominal pain, nausea, vomiting, diarrhea, constipation, dizziness    MEDICATIONS  (STANDING):  apixaban 10 milliGRAM(s) Oral every 12 hours  ATENolol  Tablet 50 milliGRAM(s) Oral daily  cyanocobalamin 1000 MICROGram(s) Oral daily  influenza   Vaccine 0.5 milliLiter(s) IntraMuscular once  lidocaine   Patch 1 Patch Transdermal daily  melatonin 5 milliGRAM(s) Oral at bedtime  pantoprazole    Tablet 40 milliGRAM(s) Oral before breakfast  simvastatin 10 milliGRAM(s) Oral at bedtime  sodium chloride 0.9%. 1000 milliLiter(s) (80 mL/Hr) IV Continuous <Continuous>  zinc oxide 20% Ointment 1 Application(s) Topical daily    MEDICATIONS  (PRN):  acetaminophen   Tablet .. 650 milliGRAM(s) Oral every 6 hours PRN Mild Pain (1 - 3), Moderate Pain (4 - 6), Severe Pain (7 - 10)  collagenase Ointment 1 Application(s) Topical daily PRN Decubitus ulcer      PHYSICAL EXAM:  GENERAL: NAD, well-groomed, well-developed  NERVOUS SYSTEM:  Alert & Oriented X3, Good concentration; Motor Strength 3/5 B/L lower extremities-same as yesterday  CHEST/LUNG: Clear to auscultation bilaterally; No rales, rhonchi, wheezing, or rubs  HEART: Regular rate and rhythm; No murmurs, rubs, or gallops  ABDOMEN: Soft, Nontender, Nondistended; Bowel sounds present  EXTREMITIES: right knee mild tenderness, no erythema or swelling, 2+ Peripheral Pulses, No clubbing, cyanosis, or edema  SKIN: No rashes or lesions    LABS:                        9.2    11.03 )-----------( 333      ( 22 Mar 2021 09:11 )             29.3     141  |  109<H>  |  28<H>  ----------------------------<  131<H>  4.6   |  26  |  1.18    Ca    8.8      22 Mar 2021 09:11          CAPILLARY BLOOD GLUCOSE

## 2021-03-23 NOTE — ED PROVIDER NOTE - WR ORDER ID 1
Doing well on current regimen of sertraline 100 mg daily and clonazepam 0.5 mg daily p.r.n..  Continue same.  Follow-up in 6 months for medication recheck.   [id number]

## 2021-03-24 LAB — COPPER SERPL-MCNC: 115 UG/DL — SIGNIFICANT CHANGE UP (ref 80–158)

## 2021-03-26 LAB — METHYLMALONATE SERPL-SCNC: 231 NMOL/L — SIGNIFICANT CHANGE UP (ref 0–378)

## 2021-03-29 LAB — VIT B1 SERPL-MCNC: 56.5 NMOL/L — LOW (ref 66.5–200)

## 2021-04-02 LAB — PYRIDOXAL PHOS SERPL-MCNC: 1.9 UG/L — LOW (ref 2–32.8)

## 2021-06-04 NOTE — DIETITIAN INITIAL EVALUATION ADULT. - PHYSCIAL ASSESSMENT
PROVIDER:[TOKEN:[[id number]],SCHEDULEDAPPT:[06/16/2021],SCHEDULEDAPPTTIME:[03:00 PM]] debilitated Pressure Injury: stage I, Unstagable, s/p Wound Care consult

## 2024-01-24 NOTE — H&P ADULT - CONSTITUTIONAL
Dr. Flood    Could a new order to diabetes services please be placed for Herminia, she has been seen by education before?       Thank you,  Kelsy Gibbs  Educational Services       detailed exam

## 2024-08-28 NOTE — PROGRESS NOTE ADULT - PROBLEM/PLAN-7
"Daily Note     Today's date: 2024  Patient name: Simin Vail  : 1971  MRN: [id number]  Referring provider: Vinicio Ahn, PT  Dx:   Encounter Diagnosis     ICD-10-CM    1. Chronic bilateral low back pain with bilateral sciatica  M54.42     M54.41     G89.29           Start Time: 1745  Stop Time: 1830  Total time in clinic (min): 45 minutes    Subjective: sore but looser      Objective: See treatment diary below      Assessment: Tolerated treatment fair. Patient demonstrated fatigue post treatment, exhibited good technique with therapeutic exercises, and would benefit from continued PT, decreased pain with MT to thoracic area today      Plan: Progress treatment as tolerated.       Precautions: standard  POC expires Unit limit Auth Expiration date PT/OT + Visit Limit?   2024   35                           Visit/Unit Tracking  AUTH Status:  Date               Used 1               Remaining                 FOTO                      Manuals                         MT LB/LE 10 min 10           PA glide T12  5                        Neuro Re-Ed                                                                                                        Ther Ex             Nu step 5 min 6 min           slant 30\"/4x 4x           Hip abd 30/30 30x           Hip add 30/30 30x           Core Heel slides 30x 30x           Open book 10x ea 10x ea                                     Ther Activity                                       Gait Training                                       Modalities                                            "
DISPLAY PLAN FREE TEXT

## 2024-08-29 NOTE — ED PROVIDER NOTE - CROS ED NEURO POS
Session ID: [id number]  Language: Libyan Creesperanza   ID: #[id number]   Name: Michelle
focal weakness, numbness